# Patient Record
Sex: MALE | Race: WHITE | NOT HISPANIC OR LATINO | Employment: OTHER | URBAN - METROPOLITAN AREA
[De-identification: names, ages, dates, MRNs, and addresses within clinical notes are randomized per-mention and may not be internally consistent; named-entity substitution may affect disease eponyms.]

---

## 2020-12-22 ENCOUNTER — TELEPHONE (OUTPATIENT)
Dept: ORTHOPEDIC SURGERY | Facility: CLINIC | Age: 70
End: 2020-12-22

## 2020-12-22 NOTE — TELEPHONE ENCOUNTER
Pt called saying he has been treated for Olecranon bursitis of left elbow by Francisco Salas MD on 7/6/20 it has been drained. Bursa sack has filled again and wants to know if you would see him.

## 2021-01-06 ENCOUNTER — OFFICE VISIT (OUTPATIENT)
Dept: ORTHOPEDIC SURGERY | Facility: CLINIC | Age: 71
End: 2021-01-06

## 2021-01-06 VITALS — WEIGHT: 235 LBS | BODY MASS INDEX: 31.83 KG/M2 | HEIGHT: 72 IN | TEMPERATURE: 98 F

## 2021-01-06 DIAGNOSIS — M25.522 LEFT ELBOW PAIN: Primary | ICD-10-CM

## 2021-01-06 PROCEDURE — 73070 X-RAY EXAM OF ELBOW: CPT | Performed by: ORTHOPAEDIC SURGERY

## 2021-01-06 PROCEDURE — 99204 OFFICE O/P NEW MOD 45 MIN: CPT | Performed by: ORTHOPAEDIC SURGERY

## 2021-01-06 RX ORDER — OMEPRAZOLE 20 MG/1
20 CAPSULE, DELAYED RELEASE ORAL DAILY
COMMUNITY
Start: 2020-11-16

## 2021-01-06 RX ORDER — CEFAZOLIN SODIUM 2 G/100ML
2 INJECTION, SOLUTION INTRAVENOUS ONCE
Status: CANCELLED | OUTPATIENT
Start: 2021-01-12 | End: 2021-01-06

## 2021-01-06 NOTE — PROGRESS NOTES
Patient: Richy Bright    YOB: 1950    Medical Record Number: [id number]    Chief Complaints:  Left elbow pain    History of Present Illness:     70 y.o. male patient who comes in for his left elbow.  He has previously seen another physician for this issue multiple times.  He reports recurrent pain and swelling over the back of his left elbow.  He has had this drained a couple of times now.  He had a similar problem on the right side that ultimately required surgery.  He tells me that he is frustrated with his persistent symptoms and wants to pursue surgery for the left elbow as well.  Current pain is mild and aching.  It is particularly painful when resting on the point of his elbow.  He reports associated swelling and prominence over the back of the left elbow.    Allergies:   Allergies   Allergen Reactions   • Benzocaine Other (See Comments)     Childhood allergy       Home Medications:    Current Outpatient Medications:   •  econazole nitrate (SPECTAZOLE) 1 % cream, , Disp: , Rfl:   •  lansoprazole (PREVACID) 30 MG capsule, Take 30 mg by mouth Daily., Disp: , Rfl:   •  meloxicam (MOBIC) 15 MG tablet, Take 15 mg by mouth Daily., Disp: , Rfl:   •  Tadalafil (CIALIS PO), Take  by mouth., Disp: , Rfl:   •  traZODone (DESYREL) 150 MG tablet, TAKE ONE-HALF TO ONE TABLET BY MOUTH ONCE NIGHTLY AS NEEDED FOR SLEEP, Disp: , Rfl:   •  amoxicillin-clavulanate (Augmentin) 875-125 MG per tablet, Take 1 tablet by mouth 2 (Two) Times a Day., Disp: 20 tablet, Rfl: 0  •  omeprazole (priLOSEC) 20 MG capsule, , Disp: , Rfl:   Past Medical History:   Diagnosis Date   • Arthritis    • GERD (gastroesophageal reflux disease)    • Hearing loss      Past Surgical History:   Procedure Laterality Date   • EXTERNAL EAR SURGERY     • HERNIA REPAIR       Social History     Occupational History   • Not on file   Tobacco Use   • Smoking status: Never Smoker   • Smokeless tobacco: Never Used   Substance and Sexual Activity   •  "Alcohol use: Not on file   • Drug use: Not on file   • Sexual activity: Not on file      Social History     Social History Narrative   • Not on file     Denies pertinent family history    Review of Systems:      Constitutional: Denies fever, shaking or chills   Eyes: Denies change in visual acuity   HEENT: Denies nasal congestion or sore throat   Respiratory: Denies cough or shortness of breath   Cardiovascular: Denies chest pain or edema  Endocrine: Denies tremors, palpitations, intolerance of heat or cold, polyuria, polydipsia.  GI: Denies abdominal pain, nausea, vomiting, bloody stools or diarrhea  : Denies frequency, urgency, incontinence, retention, or nocturia.  Musculoskeletal: Denies numbness, tingling or loss of motor function except as above  Integument: Denies rash, lesion or ulceration   Neurologic: Denies headache or focal weakness, deficits  Heme: Denies spontaneous or excessive bleeding, epistaxis, hematuria, melena, fatigue, enlarged or tender lymph nodes.      All other pertinent positives and negatives as noted above in HPI.    Physical Exam:   70 y.o. male  Vitals:    01/06/21 1403   Temp: 98 °F (36.7 °C)   TempSrc: Temporal   Weight: 107 kg (235 lb)   Height: 182.9 cm (72\")     General:  Patient is awake and alert.  Appears in no acute distress or discomfort.    Psych:  Affect and demeanor are appropriate.    Eyes:  Conjunctiva and sclera appear grossly normal.  Eyes track well and EOM seem to be intact.    Ears:  No gross abnormalities.  Hearing adequate for the exam.    Cardiovascular:  Regular rate and rhythm.    Lungs:  Good chest expansion.  Breathing unlabored.    Extremities:  Left elbow is examined.  Skin is benign.  No skin breaks, erythema, lesions, or ulcerations.  He has moderate edema and tenderness over posterior aspect of the olecranon with a bursal effusion.  There is a palpable prominence off the tip of the olecranon process.  Full elbow motion.  No instability.  Good strength " with elbow flexion, extension, pronation, supination.  Intact sensation distally.  Palpable radial pulse.  Brisk cap refill.    Radiology:  AP and lateral views of the left elbow are ordered by myself and reviewed to evaluate the patient's complaint.  No comparison films are immediately available.  The x-rays show a prominent enthesophyte at the olecranon process.  There are no obvious acute abnormalities, lesions, masses, significant degenerative changes, or other concerning findings.    Assessment/Plan:  Recalcitrant left olecranon bursitis, promiment enthesophyte    I had a lengthy discussion with the patient regarding the natural history of this condition and treatment options.  He would like to move forward with surgical removal.  We will need to address the enthesophyte.  I explained that this will likely involve taking down a portion of his triceps which will require repair.  This may be a more prolonged rehab for him then what he went through on the other side.  We talked about the risk, benefits and alternatives.  Risks discussed include the risk of infection, wound healing problems, persistent or recurrent swelling, anchor related complications including breakage or failure, repair of any triceps failure and/or rupture of the triceps tendon  and potential need for further or more extensive surgery if that were to occur.  We talked about the risk of resultant pain and/or weakness and/or deformity, iatrogenic injury to nearby nerves and blood vessels particularly the ulnar nerve which could result in permanent weakness and numbness in the hand, major blood vessel injury potentially requiring further surgery, DVT, PE, positioning related neuropraxia especially to the ulnar nerve, and anesthesia related complications resulting in death.  We had a lengthy conversation about the surgery itself and all that that entails as well as the rehabilitation and recovery.  He wants to move forward with the surgery.  He is  hoping to do this as soon as possible as he has an upcoming trip to [place].  We will see if we can accommodate him.      Compa Kimball MD    01/06/2021

## 2021-01-07 ENCOUNTER — PREP FOR SURGERY (OUTPATIENT)
Dept: OTHER | Facility: HOSPITAL | Age: 71
End: 2021-01-07

## 2021-01-07 PROBLEM — M25.522 LEFT ELBOW PAIN: Status: ACTIVE | Noted: 2021-01-07

## 2021-01-08 ENCOUNTER — TRANSCRIBE ORDERS (OUTPATIENT)
Dept: PREADMISSION TESTING | Facility: HOSPITAL | Age: 71
End: 2021-01-08

## 2021-01-08 ENCOUNTER — APPOINTMENT (OUTPATIENT)
Dept: PREADMISSION TESTING | Facility: HOSPITAL | Age: 71
End: 2021-01-08

## 2021-01-08 VITALS
SYSTOLIC BLOOD PRESSURE: 144 MMHG | HEIGHT: 72 IN | WEIGHT: 245 LBS | DIASTOLIC BLOOD PRESSURE: 92 MMHG | BODY MASS INDEX: 33.18 KG/M2 | HEART RATE: 66 BPM | RESPIRATION RATE: 20 BRPM | OXYGEN SATURATION: 95 % | TEMPERATURE: 98.9 F

## 2021-01-08 DIAGNOSIS — Z01.818 OTHER SPECIFIED PRE-OPERATIVE EXAMINATION: Primary | ICD-10-CM

## 2021-01-08 LAB
ANION GAP SERPL CALCULATED.3IONS-SCNC: 8.1 MMOL/L (ref 5–15)
BUN SERPL-MCNC: 17 MG/DL (ref 8–23)
BUN/CREAT SERPL: 21.3 (ref 7–25)
CALCIUM SPEC-SCNC: 8.8 MG/DL (ref 8.6–10.5)
CHLORIDE SERPL-SCNC: 102 MMOL/L (ref 98–107)
CO2 SERPL-SCNC: 23.9 MMOL/L (ref 22–29)
CREAT SERPL-MCNC: 0.8 MG/DL (ref 0.76–1.27)
DEPRECATED RDW RBC AUTO: 43.4 FL (ref 37–54)
ERYTHROCYTE [DISTWIDTH] IN BLOOD BY AUTOMATED COUNT: 12.7 % (ref 12.3–15.4)
GFR SERPL CREATININE-BSD FRML MDRD: 96 ML/MIN/1.73
GLUCOSE SERPL-MCNC: 106 MG/DL (ref 65–99)
HCT VFR BLD AUTO: 44.3 % (ref 37.5–51)
HGB BLD-MCNC: 15.2 G/DL (ref 13–17.7)
MCH RBC QN AUTO: 31.9 PG (ref 26.6–33)
MCHC RBC AUTO-ENTMCNC: 34.3 G/DL (ref 31.5–35.7)
MCV RBC AUTO: 92.9 FL (ref 79–97)
PLATELET # BLD AUTO: 212 10*3/MM3 (ref 140–450)
PMV BLD AUTO: 10.3 FL (ref 6–12)
POTASSIUM SERPL-SCNC: 4.4 MMOL/L (ref 3.5–5.2)
QT INTERVAL: 435 MS
RBC # BLD AUTO: 4.77 10*6/MM3 (ref 4.14–5.8)
SODIUM SERPL-SCNC: 134 MMOL/L (ref 136–145)
WBC # BLD AUTO: 4.89 10*3/MM3 (ref 3.4–10.8)

## 2021-01-08 PROCEDURE — 80048 BASIC METABOLIC PNL TOTAL CA: CPT

## 2021-01-08 PROCEDURE — 93010 ELECTROCARDIOGRAM REPORT: CPT | Performed by: INTERNAL MEDICINE

## 2021-01-08 PROCEDURE — 85027 COMPLETE CBC AUTOMATED: CPT

## 2021-01-08 PROCEDURE — 36415 COLL VENOUS BLD VENIPUNCTURE: CPT

## 2021-01-08 PROCEDURE — 93005 ELECTROCARDIOGRAM TRACING: CPT

## 2021-01-08 NOTE — DISCHARGE INSTRUCTIONS
Take the following medications the morning of surgery:  PREVACID, PRILOSEC    ARRIVE TO OUTPATIENT SURGERY CENTER 1-:  HOSPITAL WILL CALL TO CONFIRM ARRIVAL TIME ([phone])      If you are on prescription narcotic pain medication to control your pain you may also take that medication the morning of surgery.    General Instructions:  • Do not eat solid food after midnight the night before surgery.  • You may drink clear liquids day of surgery but must stop at least one hour before your hospital arrival time.  • It is beneficial for you to have a clear drink that contains carbohydrates the day of surgery.  We suggest a 12 to 20 ounce bottle of Gatorade or Powerade for non-diabetic patients or a 12 to 20 ounce bottle of G2 or Powerade Zero for diabetic patients. (Pediatric patients, are not advised to drink a 12 to 20 ounce carbohydrate drink)    Clear liquids are liquids you can see through.  Nothing red in color.     Plain water                               Sports drinks  Sodas                                   Gelatin (Jell-O)  Fruit juices without pulp such as white grape juice and apple juice  Popsicles that contain no fruit or yogurt  Tea or coffee (no cream or milk added)  Gatorade / Powerade  G2 / Powerade Zero    • Infants may have breast milk up to four hours before surgery.  • Infants drinking formula may drink formula up to six hours before surgery.   • Patients who avoid smoking, chewing tobacco and alcohol for 4 weeks prior to surgery have a reduced risk of post-operative complications.  Quit smoking as many days before surgery as you can.  • Do not smoke, use chewing tobacco or drink alcohol the day of surgery.   • If applicable bring your C-PAP/ BI-PAP machine.  • Bring any papers given to you in the doctor’s office.  • Wear clean comfortable clothes.  • Do not wear contact lenses, false eyelashes or make-up.  Bring a case for your glasses.   • Bring crutches or walker if applicable.  • Remove  all piercings.  Leave jewelry and any other valuables at home.  • Hair extensions with metal clips must be removed prior to surgery.  • The Pre-Admission Testing nurse will instruct you to bring medications if unable to obtain an accurate list in Pre-Admission Testing.          Preventing a Surgical Site Infection:  • For 2 to 3 days before surgery, avoid shaving with a razor because the razor can irritate skin and make it easier to develop an infection.    • Any areas of open skin can increase the risk of a post-operative wound infection by allowing bacteria to enter and travel throughout the body.  Notify your surgeon if you have any skin wounds / rashes even if it is not near the expected surgical site.  The area will need assessed to determine if surgery should be delayed until it is healed.  • The night prior to surgery shower using a fresh bar of anti-bacterial soap (such as Dial) and clean washcloth.  Sleep in a clean bed with clean clothing.  Do not allow pets to sleep with you.  • Shower on the morning of surgery using a fresh bar of anti-bacterial soap (such as Dial) and clean washcloth.  Dry with a clean towel and dress in clean clothing.  • Ask your surgeon if you will be receiving antibiotics prior to surgery.  • Make sure you, your family, and all healthcare providers clean their hands with soap and water or an alcohol based hand  before caring for you or your wound.    Day of surgery:  Your arrival time is approximately two hours before your scheduled surgery time.  Upon arrival, a Pre-op nurse and Anesthesiologist will review your health history, obtain vital signs, and answer questions you may have.  The only belongings needed at this time will be a list of your home medications and if applicable your C-PAP/BI-PAP machine.  A Pre-op nurse will start an IV and you may receive medication in preparation for surgery, including something to help you relax.     Please be aware that surgery does  come with discomfort.  We want to make every effort to control your discomfort so please discuss any uncontrolled symptoms with your nurse.   Your doctor will most likely have prescribed pain medications.      If you are going home after surgery you will receive individualized written care instructions before being discharged.  A responsible adult must drive you to and from the hospital on the day of your surgery and stay with you for 24 hours.  Discharge prescriptions can be filled by the hospital pharmacy during regular pharmacy hours.  If you are having surgery late in the day/evening your prescription may be e-prescribed to your pharmacy.  Please verify your pharmacy hours or chose a 24 hour pharmacy to avoid not having access to your prescription because your pharmacy has closed for the day.    If you are staying overnight following surgery, you will be transported to your hospital room following the recovery period.  [facility] has all private rooms.    If you have any questions please call Pre-Admission Testing at [phone].  Deductibles and co-payments are collected on the day of service. Please be prepared to pay the required co-pay, deductible or deposit on the day of service as defined by your plan.    Patient Education for Self-Quarantine Process    Following your COVID testing, we strongly recommend that you do not leave your home after you have been tested for COVID except to get medical care. This includes not going to work, school or to public areas.  If this is not possible for you to do please limit your activities to only required outings.  Be sure to wear a mask when you are with other people, practice social distancing and wash your hands frequently.      The following items provide additional details to keep you safe.  • Wash your hands with soap and water frequently for at least 20 seconds.   • Avoid touching your eyes, nose and mouth with unwashed hands.  • Do not share  anything - utensils, towels, food from the same bowl.   • Have your own utensils, drinking glass, dishes, towels and bedding.   • Do not have visitors.   • Do use FaceTime to stay in touch with family and friends.  • You should stay in a specific room away from others if possible.   • Stay at least 6 feet away from others in the home if you cannot have a dedicated room to yourself.   • Do not snuggle with your pet. While the CDC says there is no evidence that pets can spread COVID-19 or be infected from humans, it is probably best to avoid “petting, snuggling, being kissed or licked and sharing food (during self-quarantine)”, according to the CDC.   • Sanitize household surfaces daily. Include all high touch areas (door handles, light switches, phones, countertops, etc.)  • Do not share a bathroom with others, if possible.   • Wear a mask around others in your home if you are unable to stay in a separate room or 6 feet apart. If  you are unable to wear a mask, have your family member wear a mask if they must be within 6 feet of you.   Call your surgeon immediately if you experience any of the following symptoms:  • Sore Throat  • Shortness of Breath or difficulty breathing  • Cough  • Chills  • Body soreness or muscle pain  • Headache  • Fever  • New loss of taste or smell  • Do not arrive for your surgery ill.  Your procedure will need to be rescheduled to another time.  You will need to call your physician before the day of surgery to avoid any unnecessary exposure to hospital staff as well as other patients.

## 2021-01-09 ENCOUNTER — LAB (OUTPATIENT)
Dept: LAB | Facility: HOSPITAL | Age: 71
End: 2021-01-09

## 2021-01-09 DIAGNOSIS — Z01.818 OTHER SPECIFIED PRE-OPERATIVE EXAMINATION: ICD-10-CM

## 2021-01-09 PROCEDURE — U0004 COV-19 TEST NON-CDC HGH THRU: HCPCS

## 2021-01-09 PROCEDURE — C9803 HOPD COVID-19 SPEC COLLECT: HCPCS

## 2021-01-11 LAB — SARS-COV-2 RNA RESP QL NAA+PROBE: NOT DETECTED

## 2021-01-12 ENCOUNTER — ANESTHESIA EVENT (OUTPATIENT)
Dept: PERIOP | Facility: HOSPITAL | Age: 71
End: 2021-01-12

## 2021-01-12 ENCOUNTER — HOSPITAL ENCOUNTER (OUTPATIENT)
Facility: HOSPITAL | Age: 71
Setting detail: HOSPITAL OUTPATIENT SURGERY
Discharge: HOME | End: 2021-01-12
Attending: ORTHOPAEDIC SURGERY | Admitting: ORTHOPAEDIC SURGERY

## 2021-01-12 ENCOUNTER — ANESTHESIA (OUTPATIENT)
Dept: PERIOP | Facility: HOSPITAL | Age: 71
End: 2021-01-12

## 2021-01-12 VITALS
OXYGEN SATURATION: 96 % | RESPIRATION RATE: 16 BRPM | TEMPERATURE: 97.1 F | BODY MASS INDEX: 33.19 KG/M2 | HEART RATE: 87 BPM | DIASTOLIC BLOOD PRESSURE: 101 MMHG | WEIGHT: 244.71 LBS | SYSTOLIC BLOOD PRESSURE: 154 MMHG

## 2021-01-12 DIAGNOSIS — M25.522 LEFT ELBOW PAIN: ICD-10-CM

## 2021-01-12 PROCEDURE — 25010000002 MIDAZOLAM PER 1 MG: Performed by: ANESTHESIOLOGY

## 2021-01-12 PROCEDURE — 25010000002 PROPOFOL 10 MG/ML EMULSION: Performed by: NURSE ANESTHETIST, CERTIFIED REGISTERED

## 2021-01-12 PROCEDURE — C1713 ANCHOR/SCREW BN/BN,TIS/BN: HCPCS | Performed by: ORTHOPAEDIC SURGERY

## 2021-01-12 PROCEDURE — 24120 EXC/CRTG B1 CST/B9 TUM RDS: CPT | Performed by: ORTHOPAEDIC SURGERY

## 2021-01-12 PROCEDURE — 25010000002 DEXAMETHASONE PER 1 MG: Performed by: NURSE ANESTHETIST, CERTIFIED REGISTERED

## 2021-01-12 PROCEDURE — 25010000002 HYDROMORPHONE PER 4 MG: Performed by: ANESTHESIOLOGY

## 2021-01-12 PROCEDURE — 25010000002 FENTANYL CITRATE (PF) 100 MCG/2ML SOLUTION: Performed by: ANESTHESIOLOGY

## 2021-01-12 PROCEDURE — 25010000002 KETOROLAC TROMETHAMINE PER 15 MG: Performed by: ANESTHESIOLOGY

## 2021-01-12 PROCEDURE — 25010000002 ROPIVACAINE PER 1 MG: Performed by: ANESTHESIOLOGY

## 2021-01-12 PROCEDURE — 25010000003 CEFAZOLIN IN DEXTROSE 2-4 GM/100ML-% SOLUTION: Performed by: ORTHOPAEDIC SURGERY

## 2021-01-12 PROCEDURE — 24342 REPAIR OF RUPTURED TENDON: CPT | Performed by: ORTHOPAEDIC SURGERY

## 2021-01-12 PROCEDURE — 25010000002 ONDANSETRON PER 1 MG: Performed by: NURSE ANESTHETIST, CERTIFIED REGISTERED

## 2021-01-12 DEVICE — SUT TAPE W/TPR END 1/2 CIR TPR NDL 1.3MM 36IN: Type: IMPLANTABLE DEVICE | Site: ELBOW | Status: FUNCTIONAL

## 2021-01-12 DEVICE — SYS IMP ACL SWIVELOCK 2NDARY/FIX SUTANCH/BIOCOMP 4.75X19.1MM: Type: IMPLANTABLE DEVICE | Site: ELBOW | Status: FUNCTIONAL

## 2021-01-12 RX ORDER — MIDAZOLAM HYDROCHLORIDE 1 MG/ML
2 INJECTION INTRAMUSCULAR; INTRAVENOUS
Status: DISCONTINUED | OUTPATIENT
Start: 2021-01-12 | End: 2021-01-12 | Stop reason: HOSPADM

## 2021-01-12 RX ORDER — SODIUM CHLORIDE, SODIUM LACTATE, POTASSIUM CHLORIDE, CALCIUM CHLORIDE 600; 310; 30; 20 MG/100ML; MG/100ML; MG/100ML; MG/100ML
9 INJECTION, SOLUTION INTRAVENOUS CONTINUOUS
Status: DISCONTINUED | OUTPATIENT
Start: 2021-01-12 | End: 2021-01-12 | Stop reason: HOSPADM

## 2021-01-12 RX ORDER — DEXAMETHASONE SODIUM PHOSPHATE 10 MG/ML
INJECTION INTRAMUSCULAR; INTRAVENOUS AS NEEDED
Status: DISCONTINUED | OUTPATIENT
Start: 2021-01-12 | End: 2021-01-12 | Stop reason: SURG

## 2021-01-12 RX ORDER — EPHEDRINE SULFATE 50 MG/ML
INJECTION, SOLUTION INTRAVENOUS AS NEEDED
Status: DISCONTINUED | OUTPATIENT
Start: 2021-01-12 | End: 2021-01-12 | Stop reason: SURG

## 2021-01-12 RX ORDER — ONDANSETRON 2 MG/ML
4 INJECTION INTRAMUSCULAR; INTRAVENOUS ONCE AS NEEDED
Status: DISCONTINUED | OUTPATIENT
Start: 2021-01-12 | End: 2021-01-12 | Stop reason: HOSPADM

## 2021-01-12 RX ORDER — CEFAZOLIN SODIUM 2 G/100ML
2 INJECTION, SOLUTION INTRAVENOUS ONCE
Status: COMPLETED | OUTPATIENT
Start: 2021-01-12 | End: 2021-01-12

## 2021-01-12 RX ORDER — ACETAMINOPHEN 650 MG
TABLET, EXTENDED RELEASE ORAL AS NEEDED
Status: DISCONTINUED | OUTPATIENT
Start: 2021-01-12 | End: 2021-01-12 | Stop reason: HOSPADM

## 2021-01-12 RX ORDER — MAGNESIUM HYDROXIDE 1200 MG/15ML
LIQUID ORAL AS NEEDED
Status: DISCONTINUED | OUTPATIENT
Start: 2021-01-12 | End: 2021-01-12 | Stop reason: HOSPADM

## 2021-01-12 RX ORDER — DOCUSATE SODIUM 100 MG/1
100 CAPSULE, LIQUID FILLED ORAL 2 TIMES DAILY
Qty: 60 CAPSULE | Refills: 0 | Status: SHIPPED | OUTPATIENT
Start: 2021-01-12 | End: 2021-05-10

## 2021-01-12 RX ORDER — FENTANYL CITRATE 50 UG/ML
50 INJECTION, SOLUTION INTRAMUSCULAR; INTRAVENOUS
Status: DISCONTINUED | OUTPATIENT
Start: 2021-01-12 | End: 2021-01-12 | Stop reason: HOSPADM

## 2021-01-12 RX ORDER — EPHEDRINE SULFATE 50 MG/ML
5 INJECTION, SOLUTION INTRAVENOUS ONCE AS NEEDED
Status: DISCONTINUED | OUTPATIENT
Start: 2021-01-12 | End: 2021-01-12 | Stop reason: HOSPADM

## 2021-01-12 RX ORDER — ONDANSETRON 2 MG/ML
INJECTION INTRAMUSCULAR; INTRAVENOUS AS NEEDED
Status: DISCONTINUED | OUTPATIENT
Start: 2021-01-12 | End: 2021-01-12 | Stop reason: SURG

## 2021-01-12 RX ORDER — LIDOCAINE HYDROCHLORIDE 20 MG/ML
INJECTION, SOLUTION INFILTRATION; PERINEURAL AS NEEDED
Status: DISCONTINUED | OUTPATIENT
Start: 2021-01-12 | End: 2021-01-12 | Stop reason: SURG

## 2021-01-12 RX ORDER — ONDANSETRON 4 MG/1
4 TABLET, FILM COATED ORAL EVERY 8 HOURS PRN
Qty: 30 TABLET | Refills: 0 | Status: SHIPPED | OUTPATIENT
Start: 2021-01-12 | End: 2021-05-10

## 2021-01-12 RX ORDER — HYDROCODONE BITARTRATE AND ACETAMINOPHEN 7.5; 325 MG/1; MG/1
1-2 TABLET ORAL EVERY 4 HOURS PRN
Qty: 42 TABLET | Refills: 0 | Status: SHIPPED | OUTPATIENT
Start: 2021-01-12 | End: 2021-05-10

## 2021-01-12 RX ORDER — ISOPROPYL ALCOHOL 70 ML/100ML
LIQUID TOPICAL AS NEEDED
Status: DISCONTINUED | OUTPATIENT
Start: 2021-01-12 | End: 2021-01-12 | Stop reason: HOSPADM

## 2021-01-12 RX ORDER — KETOROLAC TROMETHAMINE 15 MG/ML
15 INJECTION, SOLUTION INTRAMUSCULAR; INTRAVENOUS ONCE
Status: COMPLETED | OUTPATIENT
Start: 2021-01-12 | End: 2021-01-12

## 2021-01-12 RX ORDER — HYDROCODONE BITARTRATE AND ACETAMINOPHEN 7.5; 325 MG/1; MG/1
1 TABLET ORAL ONCE AS NEEDED
Status: COMPLETED | OUTPATIENT
Start: 2021-01-12 | End: 2021-01-12

## 2021-01-12 RX ORDER — FLUMAZENIL 0.1 MG/ML
0.2 INJECTION INTRAVENOUS AS NEEDED
Status: DISCONTINUED | OUTPATIENT
Start: 2021-01-12 | End: 2021-01-12 | Stop reason: HOSPADM

## 2021-01-12 RX ORDER — SODIUM CHLORIDE 0.9 % (FLUSH) 0.9 %
3-10 SYRINGE (ML) INJECTION AS NEEDED
Status: DISCONTINUED | OUTPATIENT
Start: 2021-01-12 | End: 2021-01-12 | Stop reason: HOSPADM

## 2021-01-12 RX ORDER — PROPOFOL 10 MG/ML
VIAL (ML) INTRAVENOUS AS NEEDED
Status: DISCONTINUED | OUTPATIENT
Start: 2021-01-12 | End: 2021-01-12 | Stop reason: SURG

## 2021-01-12 RX ORDER — FENTANYL CITRATE 50 UG/ML
100 INJECTION, SOLUTION INTRAMUSCULAR; INTRAVENOUS
Status: DISCONTINUED | OUTPATIENT
Start: 2021-01-12 | End: 2021-01-12 | Stop reason: HOSPADM

## 2021-01-12 RX ORDER — OXYCODONE AND ACETAMINOPHEN 7.5; 325 MG/1; MG/1
1 TABLET ORAL ONCE AS NEEDED
Status: DISCONTINUED | OUTPATIENT
Start: 2021-01-12 | End: 2021-01-12 | Stop reason: HOSPADM

## 2021-01-12 RX ORDER — SODIUM CHLORIDE 0.9 % (FLUSH) 0.9 %
3 SYRINGE (ML) INJECTION EVERY 12 HOURS SCHEDULED
Status: DISCONTINUED | OUTPATIENT
Start: 2021-01-12 | End: 2021-01-12 | Stop reason: HOSPADM

## 2021-01-12 RX ORDER — HYDROMORPHONE HYDROCHLORIDE 1 MG/ML
0.5 INJECTION, SOLUTION INTRAMUSCULAR; INTRAVENOUS; SUBCUTANEOUS
Status: DISCONTINUED | OUTPATIENT
Start: 2021-01-12 | End: 2021-01-12 | Stop reason: HOSPADM

## 2021-01-12 RX ORDER — ROPIVACAINE HYDROCHLORIDE 5 MG/ML
INJECTION, SOLUTION EPIDURAL; INFILTRATION; PERINEURAL
Status: COMPLETED | OUTPATIENT
Start: 2021-01-12 | End: 2021-01-12

## 2021-01-12 RX ADMIN — FENTANYL CITRATE 50 MCG: 50 INJECTION, SOLUTION INTRAMUSCULAR; INTRAVENOUS at 13:09

## 2021-01-12 RX ADMIN — LIDOCAINE HYDROCHLORIDE 60 MG: 20 INJECTION, SOLUTION INFILTRATION; PERINEURAL at 11:18

## 2021-01-12 RX ADMIN — EPHEDRINE SULFATE 10 MG: 50 INJECTION INTRAVENOUS at 12:24

## 2021-01-12 RX ADMIN — EPHEDRINE SULFATE 10 MG: 50 INJECTION INTRAVENOUS at 11:36

## 2021-01-12 RX ADMIN — PROPOFOL 180 MG: 10 INJECTION, EMULSION INTRAVENOUS at 11:18

## 2021-01-12 RX ADMIN — HYDROMORPHONE HYDROCHLORIDE 0.5 MG: 1 INJECTION, SOLUTION INTRAMUSCULAR; INTRAVENOUS; SUBCUTANEOUS at 13:00

## 2021-01-12 RX ADMIN — ROPIVACAINE HYDROCHLORIDE 20 ML: 5 INJECTION, SOLUTION EPIDURAL; INFILTRATION; PERINEURAL at 10:26

## 2021-01-12 RX ADMIN — CEFAZOLIN SODIUM 2 G: 2 INJECTION, SOLUTION INTRAVENOUS at 11:24

## 2021-01-12 RX ADMIN — HYDROCODONE BITARTRATE AND ACETAMINOPHEN 1 TABLET: 7.5; 325 TABLET ORAL at 14:00

## 2021-01-12 RX ADMIN — FENTANYL CITRATE 50 MCG: 50 INJECTION, SOLUTION INTRAMUSCULAR; INTRAVENOUS at 14:27

## 2021-01-12 RX ADMIN — SODIUM CHLORIDE, POTASSIUM CHLORIDE, SODIUM LACTATE AND CALCIUM CHLORIDE 9 ML/HR: 600; 310; 30; 20 INJECTION, SOLUTION INTRAVENOUS at 09:38

## 2021-01-12 RX ADMIN — DEXAMETHASONE SODIUM PHOSPHATE 6 MG: 10 INJECTION INTRAMUSCULAR; INTRAVENOUS at 11:25

## 2021-01-12 RX ADMIN — FENTANYL CITRATE 50 MCG: 50 INJECTION, SOLUTION INTRAMUSCULAR; INTRAVENOUS at 10:20

## 2021-01-12 RX ADMIN — KETOROLAC TROMETHAMINE 15 MG: 15 INJECTION, SOLUTION INTRAMUSCULAR; INTRAVENOUS at 13:35

## 2021-01-12 RX ADMIN — MIDAZOLAM 2 MG: 1 INJECTION INTRAMUSCULAR; INTRAVENOUS at 10:20

## 2021-01-12 RX ADMIN — ONDANSETRON HYDROCHLORIDE 4 MG: 2 SOLUTION INTRAMUSCULAR; INTRAVENOUS at 12:00

## 2021-01-12 RX ADMIN — SODIUM CHLORIDE, POTASSIUM CHLORIDE, SODIUM LACTATE AND CALCIUM CHLORIDE 9 ML/HR: 600; 310; 30; 20 INJECTION, SOLUTION INTRAVENOUS at 13:04

## 2021-01-12 NOTE — ANESTHESIA PROCEDURE NOTES
Peripheral Block    Pre-sedation assessment completed: 1/12/2021 10:20 AM    Patient reassessed immediately prior to procedure    Patient location during procedure: pre-op  Start time: 1/12/2021 10:20 AM  Stop time: 1/12/2021 10:24 AM  Reason for block: at surgeon's request and post-op pain management  Performed by  Anesthesiologist: Han Abbott MD  Preanesthetic Checklist  Completed: patient identified, site marked, surgical consent, pre-op evaluation, timeout performed, IV checked, risks and benefits discussed and monitors and equipment checked  Prep:  Sterile barriers:gloves  Prep: ChloraPrep  Patient monitoring: blood pressure monitoring, continuous pulse oximetry and EKG  Procedure  Sedation:yes    Guidance:ultrasound guided  ULTRASOUND INTERPRETATION.  Using ultrasound guidance a 22 G gauge needle was placed in close proximity to the brachial plexus nerve, at which point, under ultrasound guidance anesthetic was injected in the area of the nerve and spread of the anesthesia was seen on ultrasound in close proximity thereto.  There were no abnormalities seen on ultrasound; a digital image was taken; and the patient tolerated the procedure with no complications. Images:still images obtained    Laterality:left  Block Type:interscalene  Injection Technique:single-shot  Needle Type:short-bevel  Needle Gauge:22 G      Medications Used: ropivacaine (NAROPIN) 0.5 % injection, 20 mL      Medications  Comment:.    Post Assessment  Injection Assessment: negative aspiration for heme, no paresthesia on injection and incremental injection  Patient Tolerance:comfortable throughout block  Complications:no

## 2021-01-12 NOTE — OP NOTE
Orthopaedic Operative Note    Facility: [facility]    Patient: Richy Bright    Medical Record Number: [id number]    YOB: 1950    Dictating Surgeon: Compa Kimball M.D.*    Primary Care Physician: Provider, No Known    Date of Operation: 1/12/2021    Pre-Operative Diagnosis:  Left elbow recalcitrant olecranon bursitis with prominent enthesophyte    Post-Operative Diagnosis:  Left elbow recalcitrant olecranon bursitis with prominent enthesophyte    Procedure Performed:      1.  Left triceps tendon repair   2.  Removal of enthesophyte and left elbow olecranon bursectomy    Surgeon: Compa Kimball MD     Assistant: ASHISH Mustafa whose assistance was critical for help with patient positioning, suctioning and irrigation, retraction, manipulation of the extremity for insertion of the implants, wound closure and application of the bandages.  Her assistance was critical to the success of this case.    Anesthesia: Regional followed by general    Complications: None.     Estimated Blood Loss: Less than 50 mL.     Implants: Arthrex 4.75 mm swivel lock with multiple suture tape sutures for triceps tendon repair    Specimens: * No orders in the log *    Brief Operative Indication: Mr. Bright had a history of recalcitrant olecranon bursitis and a prominent enthesophyte.  He had failed conservative treatment and wished to pursue surgical options.  The risks, benefits, and alternatives to an open bursectomy with removal of the enthesophyte and triceps tendon repair were discussed in detail.  We talked about the surgery itself, how it is done, and the rehabilitation and recovery.  Specifically, with regards to the risks, we talked about the risk of infection, wound healing problems, retear of the tendon necessitating revision surgery, persistent pain or recurrent bursitis which could necessitate further intervention down the road.  We talked about injury to nearby neurovascular structures  which could result in permanent weakness, numbness, or dysfunction and potentially necessitate further surgery.  Last, we did also talk about the risk of RSD, PE, DVT, anesthesia related complications and medical complications as result of surgery potentially resulting in death.  He has consented to proceed.     Description of the procedure in detail: The patient and operative site were identified in the preoperative holding area.  The surgical site was marked.  Adequate regional anesthesia of the left upper extremity was then administered by the anesthesiologist.  The patient was taken to the operating room and placed in the supine position.  Adequate general anesthesia was administered.  The patient was repositioned on the operating table.    A timeout was taken and preoperative antibiotics administered.  The left upper extremity was prepped and draped in the standard, sterile fashion.  The left upper extremity was cleaned with alcohol.  A Hibiclens scrub was performed.  The extremity was then prepped with 2 ChloraPreps.  I allowed those to dry for 3 minutes before the draping procedure was carried out.    The arm was exsanguinated with an Esmarch bandage and the tourniquet insufflated to 200 mmHg.  I began the procedure by fashioning an approximately 3 cm incision over the posterior aspect of the elbow.  This was swung laterally directly over the olecranon process to avoid directly incising over this bony prominence.  Full-thickness skin flaps were developed.      The olecranon bursa was carefully dissected out.  This was enlarged and inflamed.  This was very carefully exposed and then removed.  There was a large, palpable enthesophyte off of the tip of the olecranon process.  This was at the center of the triceps insertion.  The triceps was carefully reflected to expose the enthesophyte.  The enthesophyte was then removed using a combination of an osteotome and rongeur.  I would estimate that I had to take down  about a third to half of the triceps insertion.  I determined that a repair was indicated.  The insertion site was debrided back to bleeding, healthy bone to create a healing response.  2 suture tape sutures were passed through the torn tendon in a [place] type fashion.  These were passed through a 4.75 mm swivel lock anchor which was punched and placed in the center of the footprint for the triceps.  The anchor seated well and achieved good fixation in the bone.  This worked very nicely to reapproximate the tendon down to the insertion site.  The excess sutures were cut and removed.  I carried the elbow through range of motion.  The elbow demonstrated full, fluid motion.  There were no mechanical blocks.  The triceps seem to be firmly repaired and stable.  No further pathology was noted.  I directed my attention to closure.    The wound was copiously irrigated out with 500 cc of a Betadine-containing saline solution followed by 500 cc of plain sterile saline.  The tourniquet was deflated and I made sure that we had good hemostasis.  The wound was closed in a layered fashion using 0 Vicryl to repair the fascia and deep tissues, 2-0 Vicryl for the subcutaneous tissues, and nylon suture for the skin.  Sterile dressings were applied to the wound. The arm was placed in a sling.  The patient was awakened and taken to the recovery room in good condition.  There were no complications.    Compa Kimball MD  01/12/21

## 2021-01-12 NOTE — ANESTHESIA POSTPROCEDURE EVALUATION
Patient: Richy Bright    Procedure Summary     Date: 01/12/21 Room / Location:  [facility]    Anesthesia Start: 1110 Anesthesia Stop: 1229    Procedure: Olecranon bursa excision, enthesophyte excision and triceps repair (Left Arm Upper) Diagnosis:       Left elbow pain      (Left elbow pain [M25.522])    Surgeon: Compa Kimball MD Provider: Han Abbott MD    Anesthesia Type: general ASA Status: 2          Anesthesia Type: general    Vitals  Vitals Value Taken Time   /109 01/12/21 1430   Temp 36.2 °C (97.1 °F) 01/12/21 1430   Pulse 86 01/12/21 1443   Resp 18 01/12/21 1430   SpO2 91 % 01/12/21 1443   Vitals shown include unvalidated device data.        Post Anesthesia Care and Evaluation    Patient location during evaluation: PACU (pt seen in pacu previousl)  Patient participation: complete - patient participated  Level of consciousness: awake and alert  Pain management: adequate  Airway patency: patent  Anesthetic complications: No anesthetic complications    Cardiovascular status: acceptable  Respiratory status: acceptable  Hydration status: acceptable    Comments: BP (!) 154/101 (BP Location: Right arm, Patient Position: Lying)   Pulse 87   Temp 36.2 °C (97.1 °F) (Temporal)   Resp 16   Wt 111 kg (244 lb 11.4 oz)   SpO2 96%   BMI 33.19 kg/m²

## 2021-01-12 NOTE — ANESTHESIA PROCEDURE NOTES
Airway  Urgency: elective    Date/Time: 1/12/2021 11:20 AM  Airway not difficult    General Information and Staff    Patient location during procedure: OR  Anesthesiologist: Han Abbott MD  CRNA: Leona Méndez CRNA    Indications and Patient Condition  Indications for airway management: airway protection    Preoxygenated: yes (Pt pre-O2 with 100% O2)  Mask difficulty assessment: 0 - not attempted    Final Airway Details  Final airway type: supraglottic airway      Successful airway: classic  Size 5    Number of attempts at approach: 1  Assessment: lips, teeth, and gum same as pre-op and atraumatic intubation    Additional Comments  ATOLMA x1. No change in dentition. + ETCO2. Airway seal pressure <20cm H2O.

## 2021-01-12 NOTE — ANESTHESIA PREPROCEDURE EVALUATION
Anesthesia Evaluation     Patient summary reviewed and Nursing notes reviewed   NPO Solid Status: > 8 hours             Airway   Mallampati: II  TM distance: >3 FB  Neck ROM: full  no difficulty expected  Dental - normal exam     Pulmonary - negative pulmonary ROS and normal exam   Cardiovascular - negative cardio ROS and normal exam        Neuro/Psych- negative ROS  GI/Hepatic/Renal/Endo - negative ROS     Musculoskeletal (-) negative ROS    Abdominal  - normal exam   Substance History - negative use     OB/GYN negative ob/gyn ROS         Other                        Anesthesia Plan    ASA 2     general   (Isb popc    Not a true benzocaine allergy - he had a sunburn as a child and sprayed benzocaine all over himself and had swelling associated prob with the sunburn )  intravenous induction     Anesthetic plan, all risks, benefits, and alternatives have been provided, discussed and informed consent has been obtained with: patient.    Plan discussed with CRNA.

## 2021-01-12 NOTE — H&P
Patient: Richy Bright    YOB: 1950    Medical Record Number: [id number]    Chief Complaints:  Left elbow pain    History of Present Illness:     70 y.o. male patient who comes in for his left elbow.  He has previously seen another physician for this issue multiple times.  He reports recurrent pain and swelling over the back of his left elbow.  He has had this drained a couple of times now.  He had a similar problem on the right side that ultimately required surgery.  He tells me that he is frustrated with his persistent symptoms and wants to pursue surgery for the left elbow as well.  Current pain is mild and aching.  It is particularly painful when resting on the point of his elbow.  He reports associated swelling and prominence over the back of the left elbow.    Allergies:   Allergies   Allergen Reactions   • Benzocaine Other (See Comments)     Childhood allergy-SWELLING       Home Medications:    Current Facility-Administered Medications:   •  ceFAZolin in dextrose (ANCEF) IVPB solution 2 g, 2 g, Intravenous, Once, Compa Kimball MD  •  fentaNYL citrate (PF) (SUBLIMAZE) injection 100 mcg, 100 mcg, Intravenous, Q15 Min PRN, Víctor Lopez MD  •  lactated ringers infusion, 9 mL/hr, Intravenous, Continuous, Víctor Lopez MD, Last Rate: 9 mL/hr at 01/12/21 0938, 9 mL/hr at 01/12/21 0938  •  midazolam (VERSED) injection 2 mg, 2 mg, Intravenous, Q10 Min PRN, Víctor Lopez MD  •  sodium chloride 0.9 % flush 3 mL, 3 mL, Intravenous, Q12H, Víctor Lopez MD  •  sodium chloride 0.9 % flush 3-10 mL, 3-10 mL, Intravenous, PRN, Víctor Lopez MD  Past Medical History:   Diagnosis Date   • Arthritis    • Bone spur     LEFT ELBOW   • GERD (gastroesophageal reflux disease)    • Hearing loss      Past Surgical History:   Procedure Laterality Date   • ELBOW PROCEDURE Right     HAD A SWOLLEN GLAND IN RIGHT ELBOW REMOVED   • EXTERNAL EAR SURGERY     • HERNIA REPAIR       Social History      Occupational History   • Not on file   Tobacco Use   • Smoking status: Never Smoker   • Smokeless tobacco: Never Used   Substance and Sexual Activity   • Alcohol use: Yes   • Drug use: Never   • Sexual activity: Defer      Social History     Social History Narrative   • Not on file     Denies pertinent family history    Review of Systems:      Constitutional: Denies fever, shaking or chills   Eyes: Denies change in visual acuity   HEENT: Denies nasal congestion or sore throat   Respiratory: Denies cough or shortness of breath   Cardiovascular: Denies chest pain or edema  Endocrine: Denies tremors, palpitations, intolerance of heat or cold, polyuria, polydipsia.  GI: Denies abdominal pain, nausea, vomiting, bloody stools or diarrhea  : Denies frequency, urgency, incontinence, retention, or nocturia.  Musculoskeletal: Denies numbness, tingling or loss of motor function except as above  Integument: Denies rash, lesion or ulceration   Neurologic: Denies headache or focal weakness, deficits  Heme: Denies spontaneous or excessive bleeding, epistaxis, hematuria, melena, fatigue, enlarged or tender lymph nodes.      All other pertinent positives and negatives as noted above in HPI.    Physical Exam:   70 y.o. male  Vitals:    01/12/21 0910   BP: (!) 161/104   Pulse: 63   Resp: 16   Temp: 98 °F (36.7 °C)   TempSrc: Oral   SpO2: 95%   Weight: 111 kg (244 lb 11.4 oz)     General:  Patient is awake and alert.  Appears in no acute distress or discomfort.    Psych:  Affect and demeanor are appropriate.    Eyes:  Conjunctiva and sclera appear grossly normal.  Eyes track well and EOM seem to be intact.    Ears:  No gross abnormalities.  Hearing adequate for the exam.    Cardiovascular:  Regular rate and rhythm.    Lungs:  Good chest expansion.  Breathing unlabored.    Extremities:  Left elbow is examined.  Skin is benign.  No skin breaks, erythema, lesions, or ulcerations.  He has moderate edema and tenderness over posterior  aspect of the olecranon with a bursal effusion.  There is a palpable prominence off the tip of the olecranon process.  Full elbow motion.  No instability.  Good strength with elbow flexion, extension, pronation, supination.  Intact sensation distally.  Palpable radial pulse.  Brisk cap refill.    Radiology:  AP and lateral views of the left elbow are ordered by myself and reviewed to evaluate the patient's complaint.  No comparison films are immediately available.  The x-rays show a prominent enthesophyte at the olecranon process.  There are no obvious acute abnormalities, lesions, masses, significant degenerative changes, or other concerning findings.    Assessment/Plan:  Recalcitrant left olecranon bursitis, promiment enthesophyte    I had a lengthy discussion with the patient regarding the natural history of this condition and treatment options.  He would like to move forward with surgical removal.  We will need to address the enthesophyte.  I explained that this will likely involve taking down a portion of his triceps which will require repair.  This may be a more prolonged rehab for him then what he went through on the other side.  We talked about the risk, benefits and alternatives.  Risks discussed include the risk of infection, wound healing problems, persistent or recurrent swelling, anchor related complications including breakage or failure, repair of any triceps failure and/or rupture of the triceps tendon  and potential need for further or more extensive surgery if that were to occur.  We talked about the risk of resultant pain and/or weakness and/or deformity, iatrogenic injury to nearby nerves and blood vessels particularly the ulnar nerve which could result in permanent weakness and numbness in the hand, major blood vessel injury potentially requiring further surgery, DVT, PE, positioning related neuropraxia especially to the ulnar nerve, and anesthesia related complications resulting in death.  We  had a lengthy conversation about the surgery itself and all that that entails as well as the rehabilitation and recovery.  He wants to move forward with the surgery.  He is hoping to do this as soon as possible as he has an upcoming trip to [place].  We will see if we can accommodate him.      Compa Kimball MD    01/06/2021    History and physical updated today.  No changes.  We will proceed as scheduled.    Compa Kimball MD      01/12/21

## 2021-01-12 NOTE — BRIEF OP NOTE
TRICEP TENDON REPAIR  Progress Note    Richy Bright  1/12/2021    Pre-op Diagnosis:   Left elbow pain [M25.522]       Post-Op Diagnosis Codes:     * Left elbow pain [M25.522]    Procedure/CPT® Codes:        Procedure(s):  Olecranon bursa excision, enthesophyte excision and triceps repair    Surgeon(s):  Compa Kimball MD    Anesthesia: General    Staff:   Circulator: Marisel Ross RN; Darlene Melendez RN  Scrub Person: Autumn Velazquez  Vendor Representative: John Dorsey  Assistant: Farideh Duval APRN  Assistant: Farideh Duval APRN      Estimated Blood Loss: minimal    Urine Voided: * No values recorded between 1/12/2021 11:07 AM and 1/12/2021 12:07 PM *    Specimens:                None          Drains: * No LDAs found *    Findings: see dictation    Complications: none    Assistant: Farideh Duval APRN was responsible for performing the following activities: Retraction, suctioning and irrigation, manipulation of the arm for insertion of the implants and wound closure and their skilled assistance was necessary for the success of this case.    Compa Kimball MD     Date: 1/12/2021  Time: 12:07 EST

## 2021-01-13 ENCOUNTER — TELEPHONE (OUTPATIENT)
Dept: ORTHOPEDIC SURGERY | Facility: CLINIC | Age: 71
End: 2021-01-13

## 2021-01-13 NOTE — TELEPHONE ENCOUNTER
Post-op follow up call.  I spoke to Mr. Bright.  Reports the nerve block wore off this morning.  Pain is well controlled with oral medications.  I answered all questions to his satisfaction.  I encouraged him to call us with any questions or concerns prior to his follow-up appointment next week.  He acknowledged understanding and appreciated the call.

## 2021-01-18 ENCOUNTER — OFFICE VISIT (OUTPATIENT)
Dept: ORTHOPEDIC SURGERY | Facility: CLINIC | Age: 71
End: 2021-01-18

## 2021-01-18 VITALS — TEMPERATURE: 97.9 F | HEIGHT: 72 IN | BODY MASS INDEX: 32.51 KG/M2 | WEIGHT: 240 LBS

## 2021-01-18 DIAGNOSIS — Z09 SURGERY FOLLOW-UP: Primary | ICD-10-CM

## 2021-01-18 PROCEDURE — 99024 POSTOP FOLLOW-UP VISIT: CPT | Performed by: ORTHOPAEDIC SURGERY

## 2021-01-18 RX ORDER — TRAMADOL HYDROCHLORIDE 50 MG/1
50 TABLET ORAL EVERY 4 HOURS PRN
Qty: 60 TABLET | Refills: 0 | Status: SHIPPED | OUTPATIENT
Start: 2021-01-18 | End: 2021-07-12

## 2021-01-18 NOTE — PROGRESS NOTES
Mr. Bright is now a week out from surgery.  He has been taking high-dose Tylenol for the pain.  He estimates he is taking 2000 mg every 4 hours.  The narcotics were a little too strong.  Denies any problems or issues.  Overall, he feels like the elbow is steadily getting better.    His wound is benign appearing.  Sutures remain in place.  Elbow motion is nearly full, lacking just about 10 degrees of flexion.  Triceps is palpably intact.    Assessment: 1 week status post left elbow bursal excision and triceps repair    Plan: I considered too early to take his stitches out.  I recommend having him come back next week for suture removal.  I explained that he is taking far too much Tylenol and he is at risk for toxicity.  I agreed to call in a prescription for tramadol.  Risk of this medicine were discussed.  I will see him back next week for suture removal.  We discussed appropriate activity modifications and restrictions in the interim.

## 2021-01-25 ENCOUNTER — OFFICE VISIT (OUTPATIENT)
Dept: ORTHOPEDIC SURGERY | Facility: CLINIC | Age: 71
End: 2021-01-25

## 2021-01-25 VITALS — HEIGHT: 72 IN | BODY MASS INDEX: 32.51 KG/M2 | TEMPERATURE: 97.2 F | WEIGHT: 240 LBS

## 2021-01-25 DIAGNOSIS — Z09 SURGERY FOLLOW-UP: Primary | ICD-10-CM

## 2021-01-25 PROCEDURE — 99024 POSTOP FOLLOW-UP VISIT: CPT | Performed by: NURSE PRACTITIONER

## 2021-01-25 RX ORDER — LISINOPRIL 10 MG/1
TABLET ORAL
COMMUNITY
Start: 2021-01-22 | End: 2022-06-22

## 2021-01-25 NOTE — PROGRESS NOTES
"Mr. Bright is 2 weeks status post left elbow bursal excision and triceps repair.  He follows up today for suture removal.  He reports he tried the tramadol prescribed by Dr. Kimball, however, this caused significant constipation.  He tells me his pain is well controlled with Tylenol.    Vitals:    01/25/21 0844   Temp: 97.2 °F (36.2 °C)   Weight: 109 kg (240 lb)   Height: 182.9 cm (72\")     Exam: Wound is benign appearing.  Sutures remain in place.  Elbow motion is full.  Triceps is palpably intact.    Assessment: 2 weeks status post left elbow bursal excision and triceps repair    Plan: Sutures removed today and replaced with Steri-Strips.  We discussed appropriate activity modifications and restrictions.  He is leaving for [place] soon.  I instructed him to avoid getting the wound wet in the ocean, hot tub, or pool.  He may shower with Steri-Strips in place.  He will follow-up with Dr. Kimball in 1 month.  We discussed the proper dosage for Tylenol and the fact he should never exceed more than 4000 mg in 24 hours.  Acknowledged understanding of all we discussed.      Farideh Duval, APRN    01/25/2021    "

## 2021-02-01 ENCOUNTER — TELEPHONE (OUTPATIENT)
Dept: ORTHOPEDIC SURGERY | Facility: CLINIC | Age: 71
End: 2021-02-01

## 2021-02-01 NOTE — TELEPHONE ENCOUNTER
Patient called and states he is having a lot of pain in his arm and swelling and burning. He would like to be worked into your schedule on Wednesday. I offered for him to see Farideh but he wants only to see you at this time. He also stated that he tried to do an appointment request on my chart last week and never got a response

## 2021-02-02 NOTE — TELEPHONE ENCOUNTER
I have scheduled pt to see Dr. Kimball on Friday at [facility]. I have call the patient and no answer. Patient is active on my chart so I did send him a [employer] message as well.

## 2021-02-05 ENCOUNTER — OFFICE VISIT (OUTPATIENT)
Dept: ORTHOPEDIC SURGERY | Facility: CLINIC | Age: 71
End: 2021-02-05

## 2021-02-05 VITALS — WEIGHT: 240 LBS | HEIGHT: 72 IN | TEMPERATURE: 96.8 F | BODY MASS INDEX: 32.51 KG/M2

## 2021-02-05 DIAGNOSIS — Z09 SURGERY FOLLOW-UP: ICD-10-CM

## 2021-02-05 DIAGNOSIS — M25.522 LEFT ELBOW PAIN: Primary | ICD-10-CM

## 2021-02-05 PROCEDURE — 99024 POSTOP FOLLOW-UP VISIT: CPT | Performed by: ORTHOPAEDIC SURGERY

## 2021-02-05 PROCEDURE — 73070 X-RAY EXAM OF ELBOW: CPT | Performed by: ORTHOPAEDIC SURGERY

## 2021-02-05 RX ORDER — ACETAMINOPHEN 500 MG
500 TABLET ORAL EVERY 6 HOURS PRN
COMMUNITY

## 2021-02-05 NOTE — PROGRESS NOTES
Mr. Bright follows up for his left elbow.  He has been having some pain and swelling and he was concerned.  He is leaving for [place] tomorrow.  No fevers, chills, sweats or drainage.    His incision is healed.  No drainage or erythema.  He has a trace residual bursal effusion.  On palpation, the triceps is palpably intact.  Mild tenderness over the tip of the olecranon process.  He has full elbow motion.    Assessment: Follow-up now 3 weeks status post left elbow open olecranon bursal excision, enthesophyte excision and triceps repair    Plan: I questioned him about how active he has been.  He admits that he has actually been very active.  He is not doing any heavy lifting but he has not been holding back with his day-to-day activities.  I think he is just overdoing it and it is causing him increased pain and swelling.  I think he needs to take it a little easier.  His repair seems to be intact but he needs to exercise a little more caution.  He acknowledged understanding of my concerns.  I will see him back when he gets back from [place].

## 2021-03-01 ENCOUNTER — OFFICE VISIT (OUTPATIENT)
Dept: ORTHOPEDIC SURGERY | Facility: CLINIC | Age: 71
End: 2021-03-01

## 2021-03-01 VITALS — BODY MASS INDEX: 31.83 KG/M2 | TEMPERATURE: 97.2 F | HEIGHT: 72 IN | WEIGHT: 235 LBS

## 2021-03-01 DIAGNOSIS — Z09 SURGERY FOLLOW-UP: Primary | ICD-10-CM

## 2021-03-01 PROCEDURE — 99024 POSTOP FOLLOW-UP VISIT: CPT | Performed by: ORTHOPAEDIC SURGERY

## 2021-03-01 NOTE — PROGRESS NOTES
"Mr. Bright is now about 6 weeks out from his bursal excision, enthesophyte excision and triceps repair.  He is still having some burning discomfort on the back of his elbow as well as some itching.  He reports that resting on the point of his elbow remains uncomfortable.  It is taking longer to get better than he anticipated.  He tells me he has tried to be careful with the elbow.  He did have one incident where he was pushing himself up out of a Jacuzzi with his left arm.  He had some pain in the elbow at that time.  He has not noticed any functional deficit.  He does feel like his left triceps is \"a little flabby\" compared to the right.    He has 1 Vicryl stitch visible extruding from the wound.  The wound itself looks healed.  There is no erythema, drainage or other evidence for infection.  Mild tenderness along the incision itself.  He has a trace bursal effusion.  His triceps tendon is palpably intact.  He does have some atrophy of his left triceps as compared to the right.  He has full elbow motion.  He can actively extend against resistance with mild discomfort.    Assessment: 6-week status post left triceps repair, enthesophyte excision and olecranon bursal excision    Plan: He seems to be doing okay but he is frustrated that it is taking this long.  He had a much quicker recovery on the right side.  I explained that we had to do a lot more work on the left than what they did on the right.  This is probably going to take another few months.  He has some incisional discomfort and I suggested a prescription from [facility].  I will get that ordered for him.  We discussed appropriate activity modifications and restrictions.  He needs to continue to exercise caution to protect the repair.  I am going to have him follow-up with me in 6 weeks.  "

## 2021-03-02 DIAGNOSIS — Z23 IMMUNIZATION DUE: ICD-10-CM

## 2021-04-12 ENCOUNTER — OFFICE VISIT (OUTPATIENT)
Dept: ORTHOPEDIC SURGERY | Facility: CLINIC | Age: 71
End: 2021-04-12

## 2021-04-12 VITALS — BODY MASS INDEX: 31.83 KG/M2 | HEIGHT: 72 IN | TEMPERATURE: 98 F | WEIGHT: 235 LBS

## 2021-04-12 DIAGNOSIS — Z09 SURGERY FOLLOW-UP: Primary | ICD-10-CM

## 2021-04-12 PROCEDURE — 99024 POSTOP FOLLOW-UP VISIT: CPT | Performed by: ORTHOPAEDIC SURGERY

## 2021-04-12 NOTE — PROGRESS NOTES
Chief complaint: Follow-up status post left elbow bursa and enthesophyte excision with triceps repair    Mr. Bright follows up today for his left elbow.  He says it is much better.  The compound pharmacy formulation did help.  He still has some occasional discomfort, particularly when driving.  Otherwise, things are steadily improving and he is happy with his progress.    His incision is healed.  Triceps is palpably intact.  Mild incisional discomfort on palpation.  Full elbow motion.    Assessment: 3-month status post left elbow bursa and enthesophyte excision with triceps repair    Plan: Progress activities as tolerated.  We will have him follow-up in 3 months for final visit.

## 2021-05-10 ENCOUNTER — OFFICE VISIT (OUTPATIENT)
Dept: ORTHOPEDIC SURGERY | Facility: CLINIC | Age: 71
End: 2021-05-10

## 2021-05-10 ENCOUNTER — TELEPHONE (OUTPATIENT)
Dept: ORTHOPEDIC SURGERY | Facility: CLINIC | Age: 71
End: 2021-05-10

## 2021-05-10 VITALS — BODY MASS INDEX: 32.51 KG/M2 | TEMPERATURE: 97.1 F | HEIGHT: 72 IN | WEIGHT: 240 LBS

## 2021-05-10 DIAGNOSIS — Z09 SURGERY FOLLOW-UP: ICD-10-CM

## 2021-05-10 DIAGNOSIS — M25.522 LEFT ELBOW PAIN: Primary | ICD-10-CM

## 2021-05-10 PROCEDURE — 99213 OFFICE O/P EST LOW 20 MIN: CPT | Performed by: NURSE PRACTITIONER

## 2021-05-10 PROCEDURE — 73070 X-RAY EXAM OF ELBOW: CPT | Performed by: NURSE PRACTITIONER

## 2021-05-10 RX ORDER — METHYLPREDNISOLONE 4 MG/1
TABLET ORAL
Qty: 21 TABLET | Refills: 0 | Status: SHIPPED | OUTPATIENT
Start: 2021-05-10 | End: 2022-06-22

## 2021-05-12 NOTE — PROGRESS NOTES
"Chief Complaint:  Left elbow pain    HPI:  Mr. Bright returns to clinic today for follow-up left elbow pain.  He is 4 months status post left elbow bursa and enthesophyte excision with triceps repair by Dr. Kimball.  Patient reports he was doing very well until recently.  Reports he is experiencing moderate pain which is described as burning.  He reports his pain is waking him at night.  Additionally, he is experiencing redness, warmth, and itching at the site.  He denies injury or precipitating factors.  He says he has been very careful to avoid pressure on the elbow.    Vitals:    05/10/21 1431   Temp: 97.1 °F (36.2 °C)   TempSrc: Temporal   Weight: 109 kg (240 lb)   Height: 182.9 cm (72\")     Exam:  His incision is well healed.  Skin with erythema, posterior elbow about the incision.  No edema or effusion.  Increased warmth compared to the contralateral side.  Mild incisional discomfort with palpation.  Triceps is palpably intact.  Full elbow motion.         Imaging:  Dr. Kimball and I reviewed the x-rays together.  AP and lateral views of the left elbow are ordered by myself and reviewed to evaluate the patient's complaint.  No comparison films are immediately available.  The x-rays show no obvious acute abnormalities, lesions, masses, significant degenerative changes, or other concerning findings.    Assessment:  4 months status post left elbow bursa and enthesophyte excision with triceps repair with pain and erythema, suspect triceps tendon irritation    Plan:  His elbow does not seem to be infected.  His symptoms are likely from irritation of the triceps tendon.  Also, there is a possibility the bursa is trying to reform.  We discussed conservative treatments including ice, rest, activity modification, and oral steroids.  I offered to provide him with an elbow brace, but he declined.  He acknowledged understanding of all we discussed and agrees to gives these a try.  I have given him a prescription for a " Medrol Dosepak.  The risks of this medication were discussed.  Going forward, he will follow up with Dr. Kimball if his symptoms persist.      Farideh Duval, APRN     05/10/2021

## 2021-07-12 ENCOUNTER — OFFICE VISIT (OUTPATIENT)
Dept: ORTHOPEDIC SURGERY | Facility: CLINIC | Age: 71
End: 2021-07-12

## 2021-07-12 VITALS — BODY MASS INDEX: 31.83 KG/M2 | WEIGHT: 235 LBS | TEMPERATURE: 98.1 F | HEIGHT: 72 IN

## 2021-07-12 DIAGNOSIS — Z98.890 HX OF ELBOW SURGERY: ICD-10-CM

## 2021-07-12 DIAGNOSIS — M25.522 LEFT ELBOW PAIN: Primary | ICD-10-CM

## 2021-07-12 PROCEDURE — 73070 X-RAY EXAM OF ELBOW: CPT | Performed by: NURSE PRACTITIONER

## 2021-07-12 PROCEDURE — 99213 OFFICE O/P EST LOW 20 MIN: CPT | Performed by: NURSE PRACTITIONER

## 2021-07-12 RX ORDER — ATORVASTATIN CALCIUM 10 MG/1
10 TABLET, FILM COATED ORAL DAILY
COMMUNITY
Start: 2021-06-30 | End: 2022-06-22

## 2021-07-12 NOTE — PROGRESS NOTES
"Chief Complaint:  Follow up left elbow pain    HPI:  Mr. Bright returns to clinic today for left elbow follow up.  He is 7 months status post left elbow bursa and enthesophyte excision with triceps repair by Dr. Kimball.  Patient reports he is experiencing some persistent, intermittent burning pain which has improve somewhat since his last visit.  He says weakness is his main concern.  Reports doing a breast stroke while swimming is very painful.  Reports he cannot pull himself up the ladder to exit the swimming pool or push up from the floor due to weakness.      Vitals:    07/12/21 0815   Temp: 98.1 °F (36.7 °C)   Weight: 107 kg (235 lb)   Height: 182.9 cm (72\")       Exam: Scar on left elbow consistent with prior surgery.  Skin is benign.  No effusion.  No edema.  No increased warmth or erythema.  Tenderness to deep palpation over the triceps insertion.  Triceps is palpably intact.  Full elbow motion.  Good strength with resisted forward extension of the elbow albeit with discomfort.  Good strength with wrist flexion and extension, , and pinch.  Intact motor and sensory function in lower arm, hand, and fingers.  Palpable radial pulse.  Brisk capillary refill.     Imaging: AP and lateral views of the left elbow are ordered and reviewed to evaluate the patient's complaint.  These are compared to previous films.  The x-rays show no obvious acute abnormalities, lesions, masses, significant degenerative changes, or other concerning findings.    Assessment:  7 months status post left elbow bursa and enthesophyte excision with triceps repair with chronic pain    Plan:   On exam his triceps tendon appears to be intact.  The pain his is experiencing is possible related to ulnar nerve irritation.  I explained that nerves regenerate very slowly.  He strongly feels there is something wrong based on the weakness he is experiencing.  I recommended getting a MRI for further evaluation.  He agreed.  I have entered this " referral for him.  I or Dr. Kimball will call him with the results and come up with a plan at that time.  He verbalized understanding and agreed with this plan.     Farideh Duval, APRN  07/12/2021

## 2021-07-23 ENCOUNTER — HOSPITAL ENCOUNTER (OUTPATIENT)
Dept: MRI IMAGING | Facility: HOSPITAL | Age: 71
Discharge: HOME | End: 2021-07-23
Admitting: NURSE PRACTITIONER

## 2021-07-23 DIAGNOSIS — M25.522 LEFT ELBOW PAIN: ICD-10-CM

## 2021-07-23 DIAGNOSIS — Z98.890 HX OF ELBOW SURGERY: ICD-10-CM

## 2021-07-23 PROCEDURE — 73221 MRI JOINT UPR EXTREM W/O DYE: CPT

## 2021-07-28 ENCOUNTER — TELEPHONE (OUTPATIENT)
Dept: ORTHOPEDIC SURGERY | Facility: CLINIC | Age: 71
End: 2021-07-28

## 2021-07-28 DIAGNOSIS — Z98.890 HX OF ELBOW SURGERY: Primary | ICD-10-CM

## 2021-07-28 NOTE — TELEPHONE ENCOUNTER
I spoke with him.  We discussed his MRI results.  I have referred him to formal physical therapy.  I instructed him to follow-up with me in about 6 weeks if no better.

## 2021-10-06 ENCOUNTER — TELEPHONE (OUTPATIENT)
Dept: ORTHOPEDIC SURGERY | Facility: CLINIC | Age: 71
End: 2021-10-06

## 2021-10-06 NOTE — TELEPHONE ENCOUNTER
I spoke to Mr. Bright.  I apologize for the confusion.  I have no explanation for why he was not contacted regarding physical therapy.  I have printed the physical therapy order and will mail that to him today.  Address verified with patient.  He verbalized understanding and appreciated the assistance.

## 2021-10-06 NOTE — TELEPHONE ENCOUNTER
Caller:  DIANNA HORAN    Relationship to patient: SELF    Best call back number:     Patient is needing: Patient called in to hub, very frustrated & concerned about his referral for PT.  Per patient, he wants the order/REFERRAL for PT to be sent to him, so he can seek PT at another place - other than [facility]. Please reach out to the patient. As the patient wasn't sure of the name of the place he wants to go, but knows it's in [place], [place].  Verified phone# with [employer] to be correct - per patient can call anytime - as that is his cell - no certain time or day.

## 2022-06-22 ENCOUNTER — OFFICE VISIT (OUTPATIENT)
Dept: INTERNAL MEDICINE | Facility: CLINIC | Age: 72
End: 2022-06-22

## 2022-06-22 ENCOUNTER — LAB (OUTPATIENT)
Dept: LAB | Facility: HOSPITAL | Age: 72
End: 2022-06-22

## 2022-06-22 VITALS
DIASTOLIC BLOOD PRESSURE: 84 MMHG | SYSTOLIC BLOOD PRESSURE: 136 MMHG | HEART RATE: 63 BPM | WEIGHT: 244.9 LBS | BODY MASS INDEX: 33.17 KG/M2 | OXYGEN SATURATION: 94 % | HEIGHT: 72 IN

## 2022-06-22 DIAGNOSIS — G89.29 CHRONIC LEFT-SIDED LOW BACK PAIN WITH LEFT-SIDED SCIATICA: ICD-10-CM

## 2022-06-22 DIAGNOSIS — Z12.5 PROSTATE CANCER SCREENING: ICD-10-CM

## 2022-06-22 DIAGNOSIS — N40.0 ENLARGED PROSTATE: ICD-10-CM

## 2022-06-22 DIAGNOSIS — Z00.00 MEDICARE ANNUAL WELLNESS VISIT, INITIAL: ICD-10-CM

## 2022-06-22 DIAGNOSIS — M25.522 LEFT ELBOW PAIN: ICD-10-CM

## 2022-06-22 DIAGNOSIS — E78.2 MIXED HYPERLIPIDEMIA: ICD-10-CM

## 2022-06-22 DIAGNOSIS — I10 PRIMARY HYPERTENSION: Primary | ICD-10-CM

## 2022-06-22 DIAGNOSIS — F51.01 PRIMARY INSOMNIA: ICD-10-CM

## 2022-06-22 DIAGNOSIS — M54.42 CHRONIC LEFT-SIDED LOW BACK PAIN WITH LEFT-SIDED SCIATICA: ICD-10-CM

## 2022-06-22 PROBLEM — R56.9 SEIZURE (HCC): Status: ACTIVE | Noted: 2022-06-22

## 2022-06-22 LAB
ALBUMIN SERPL-MCNC: 4.3 G/DL (ref 3.5–5.2)
ALBUMIN/GLOB SERPL: 1.6 G/DL
ALP SERPL-CCNC: 69 U/L (ref 39–117)
ALT SERPL W P-5'-P-CCNC: 36 U/L (ref 1–41)
ANION GAP SERPL CALCULATED.3IONS-SCNC: 11 MMOL/L (ref 5–15)
AST SERPL-CCNC: 26 U/L (ref 1–40)
BILIRUB SERPL-MCNC: 0.4 MG/DL (ref 0–1.2)
BUN SERPL-MCNC: 18 MG/DL (ref 8–23)
BUN/CREAT SERPL: 17.6 (ref 7–25)
CALCIUM SPEC-SCNC: 9.4 MG/DL (ref 8.6–10.5)
CHLORIDE SERPL-SCNC: 102 MMOL/L (ref 98–107)
CHOLEST SERPL-MCNC: 173 MG/DL (ref 0–200)
CO2 SERPL-SCNC: 24 MMOL/L (ref 22–29)
CREAT SERPL-MCNC: 1.02 MG/DL (ref 0.76–1.27)
EGFRCR SERPLBLD CKD-EPI 2021: 78.6 ML/MIN/1.73
GLOBULIN UR ELPH-MCNC: 2.7 GM/DL
GLUCOSE SERPL-MCNC: 108 MG/DL (ref 65–99)
HDLC SERPL-MCNC: 56 MG/DL (ref 40–60)
LDLC SERPL CALC-MCNC: 101 MG/DL (ref 0–100)
LDLC/HDLC SERPL: 1.78 {RATIO}
POTASSIUM SERPL-SCNC: 5 MMOL/L (ref 3.5–5.2)
PROT SERPL-MCNC: 7 G/DL (ref 6–8.5)
PSA SERPL-MCNC: 0.59 NG/ML (ref 0–4)
SODIUM SERPL-SCNC: 137 MMOL/L (ref 136–145)
TRIGL SERPL-MCNC: 87 MG/DL (ref 0–150)
VLDLC SERPL-MCNC: 16 MG/DL (ref 5–40)

## 2022-06-22 PROCEDURE — 1170F FXNL STATUS ASSESSED: CPT | Performed by: FAMILY MEDICINE

## 2022-06-22 PROCEDURE — 99214 OFFICE O/P EST MOD 30 MIN: CPT | Performed by: FAMILY MEDICINE

## 2022-06-22 PROCEDURE — 1125F AMNT PAIN NOTED PAIN PRSNT: CPT | Performed by: FAMILY MEDICINE

## 2022-06-22 PROCEDURE — 80061 LIPID PANEL: CPT | Performed by: FAMILY MEDICINE

## 2022-06-22 PROCEDURE — 36415 COLL VENOUS BLD VENIPUNCTURE: CPT | Performed by: FAMILY MEDICINE

## 2022-06-22 PROCEDURE — G0103 PSA SCREENING: HCPCS | Performed by: FAMILY MEDICINE

## 2022-06-22 PROCEDURE — 1159F MED LIST DOCD IN RCRD: CPT | Performed by: FAMILY MEDICINE

## 2022-06-22 PROCEDURE — G0439 PPPS, SUBSEQ VISIT: HCPCS | Performed by: FAMILY MEDICINE

## 2022-06-22 PROCEDURE — 80053 COMPREHEN METABOLIC PANEL: CPT | Performed by: FAMILY MEDICINE

## 2022-06-22 RX ORDER — GABAPENTIN 100 MG/1
100 CAPSULE ORAL 3 TIMES DAILY
Qty: 90 CAPSULE | Refills: 0 | Status: SHIPPED | OUTPATIENT
Start: 2022-06-22 | End: 2022-07-22 | Stop reason: SDUPTHER

## 2022-06-22 RX ORDER — TADALAFIL 5 MG/1
TABLET ORAL
COMMUNITY

## 2022-06-22 NOTE — PROGRESS NOTES
The ABCs of the Annual Wellness Visit  Subsequent Medicare Wellness Visit    Chief Complaint   Patient presents with   • new patient visit   • Hypertension   • Hyperlipidemia   • would like to have a PSA   • Medicare Wellness-Initial Visit      Subjective    History of Present Illness:  Richy Bright is a 71 y.o. male who presents for a Subsequent Medicare Wellness Visit.    The following portions of the patient's history were reviewed and   updated as appropriate: allergies, current medications, past family history, past medical history, past social history, past surgical history and problem list.     Compared to one year ago, the patient feels his physical   health is better.    Compared to one year ago, the patient feels his mental   health is better.    Recent Hospitalizations:  He was not admitted to the hospital during the last year.       Current Medical Providers:  Patient Care Team:  George Sherwood MD as PCP - General (Family Medicine)  Chichi Jaimes MD (Family Medicine)    Outpatient Medications Prior to Visit   Medication Sig Dispense Refill   • acetaminophen (TYLENOL) 500 MG tablet Take 500 mg by mouth Every 6 (Six) Hours As Needed for Mild Pain .     • Loratadine (CLARITIN PO) Take 1 tablet by mouth Daily.     • omeprazole (priLOSEC) 20 MG capsule Take 20 mg by mouth Daily.     • tadalafil (CIALIS) 5 MG tablet at bed time.     • traZODone (DESYREL) 150 MG tablet Take 75 mg by mouth Every Night.     • meloxicam (MOBIC) 15 MG tablet Take 15 mg by mouth Daily. HOLD FOR SURGERY     • Tadalafil (CIALIS PO) Take 1 tablet by mouth Daily As Needed. HOLD 48 HOURS PRIOR TO SURGERY     • atorvastatin (LIPITOR) 10 MG tablet Take 10 mg by mouth Daily.     • econazole nitrate (SPECTAZOLE) 1 % cream Apply 1 application topically to the appropriate area as directed Daily. JOCK ITCH     • lansoprazole (PREVACID) 30 MG capsule Take 30 mg by mouth Daily.     • lisinopril (PRINIVIL,ZESTRIL) 10 MG tablet      •  "methylPREDNISolone (MEDROL) 4 MG dose pack Take as directed on package instructions. 21 tablet 0     No facility-administered medications prior to visit.       No opioid medication identified on active medication list. I have reviewed chart for other potential  high risk medication/s and harmful drug interactions in the elderly.          Aspirin is not on active medication list.  Aspirin use is not indicated based on review of current medical condition/s. Risk of harm outweighs potential benefits.  .    Patient Active Problem List   Diagnosis   • Left elbow pain   • Primary hypertension   • Mixed hyperlipidemia   • Enlarged prostate   • Medicare annual wellness visit, initial   • Primary insomnia   • Seizure (HCC)   • Prostate cancer screening   • Chronic left-sided low back pain with left-sided sciatica     Advance Care Planning   Advance Directive is on file.  ACP discussion was held with the patient during this visit. Patient has an advance directive in EMR which is still valid.           Objective       Vitals:    06/22/22 0908   BP: 136/84   BP Location: Right arm   Patient Position: Sitting   Cuff Size: Large Adult   Pulse: 63   SpO2: 94%   Weight: 111 kg (244 lb 14.4 oz)   Height: 182.9 cm (72\")   PainSc:   8     BMI Readings from Last 1 Encounters:   06/22/22 33.21 kg/m²   BMI is above normal parameters. Recommendations include: nutrition counseling    Does the patient have evidence of cognitive impairment? No    Physical Exam            HEALTH RISK ASSESSMENT    Smoking Status:  Social History     Tobacco Use   Smoking Status Never Smoker   Smokeless Tobacco Never Used     Alcohol Consumption:  Social History     Substance and Sexual Activity   Alcohol Use Yes     Fall Risk Screen:    STEADI Fall Risk Assessment was completed, and patient is at LOW risk for falls.Assessment completed on:6/22/2022    Depression Screening:  PHQ-2/PHQ-9 Depression Screening 6/22/2022   Little Interest or Pleasure in Doing " Things 3-->nearly every day   Feeling Down, Depressed or Hopeless 0-->not at all   Trouble Falling or Staying Asleep, or Sleeping Too Much 3-->nearly every day   Feeling Tired or Having Little Energy 0-->not at all   Poor Appetite or Overeating 0-->not at all   Feeling Bad about Yourself - or that You are a Failure or Have Let Yourself or Your Family Down 0-->not at all   Trouble Concentrating on Things, Such as Reading the Newspaper or Watching Television 0-->not at all   Moving or Speaking So Slowly that Other People Could Have Noticed? Or the Opposite - Being So Fidgety 0-->not at all   Thoughts that You Would be Better Off Dead or of Hurting Yourself in Some Way 0-->not at all   PHQ-9: Brief Depression Severity Measure Score 6   If You Checked Off Any Problems, How Difficult Have These Problems Made It For You to Do Your Work, Take Care of Things at Home, or Get Along with Other People? not difficult at all       Health Habits and Functional and Cognitive Screening:  Functional & Cognitive Status 6/22/2022   Do you have difficulty preparing food and eating? No   Do you have difficulty bathing yourself, getting dressed or grooming yourself? No   Do you have difficulty using the toilet? No   Do you have difficulty moving around from place to place? No   Do you have trouble with steps or getting out of a bed or a chair? No   Current Diet Well Balanced Diet   Dental Exam Up to date   Eye Exam Up to date   Exercise (times per week) 7 times per week   Current Exercises Include Walking;Yard Work   Do you need help using the phone?  No   Are you deaf or do you have serious difficulty hearing?  Yes   Do you need help with transportation? No   Do you need help shopping? No   Do you need help preparing meals?  No   Do you need help with housework?  No   Do you need help with laundry? No   Do you need help taking your medications? No   Do you need help managing money? No   Do you ever drive or ride in a car without wearing  a seat belt? No   Have you felt unusual stress, anger or loneliness in the last month? No   Who do you live with? Other   If you need help, do you have trouble finding someone available to you? No   Have you been bothered in the last four weeks by sexual problems? No   Do you have difficulty concentrating, remembering or making decisions? No       Age-appropriate Screening Schedule:  Refer to the list below for future screening recommendations based on patient's age, sex and/or medical conditions. Orders for these recommended tests are listed in the plan section. The patient has been provided with a written plan.    Health Maintenance   Topic Date Due   • TDAP/TD VACCINES (1 - Tdap) Never done   • LIPID PANEL  06/22/2022   • INFLUENZA VACCINE  10/01/2022   • ZOSTER VACCINE  Completed              Assessment & Plan     CMS Preventative Services Quick Reference  Risk Factors Identified During Encounter  Chronic Pain   Obesity/Overweight   The above risks/problems have been discussed with the patient.  Follow up actions/plans if indicated are seen below in the Assessment/Plan Section.  Pertinent information has been shared with the patient in the After Visit Summary.    Diagnoses and all orders for this visit:    1. Primary hypertension (Primary)  -     Comprehensive Metabolic Panel    2. Mixed hyperlipidemia  -     Lipid Panel    3. Enlarged prostate    4. Medicare annual wellness visit, initial    5. Left elbow pain    6. Primary insomnia    7. Prostate cancer screening  -     PSA Screen    8. Chronic left-sided low back pain with left-sided sciatica  -     gabapentin (NEURONTIN) 100 MG capsule; Take 1 capsule by mouth 3 (Three) Times a Day.  Dispense: 90 capsule; Refill: 0        Follow Up:   Return in about 6 months (around 12/22/2022), or if symptoms worsen or fail to improve, for Recheck.     An After Visit Summary and PPPS were given to the patient.  Ongoing  management of chronic medical problems.

## 2022-06-22 NOTE — PROGRESS NOTES
"Chief Complaint  new patient visit, Hypertension, Hyperlipidemia, would like to have a PSA, and Medicare Wellness-Initial Visit    Subjective        Richy Bright presents to [facility]  History of Present Illness  Patient new to the office with chronic problems of hypertension hyperlipidemia chronic low back pain chronic left elbow pain enlarged prostate with family history of prostate cancer would like a PSA checked he does not take any blood pressure medicine he monitors it with goal of less than 140/90 he had been on statin therapy and no longer is taking he has no significant change in his weight he is has had chronic low back pain most of his life been on and off medications he does not take the meloxicam is taking right now on is doing very much as he does take some Tylenol  He has no falling down he sitting comfortably  With his pain is presently at an 8/10 is learning to live with it.   bursa excision Dr. Kimball and January 2021.    Objective   Vital Signs:  /84 (BP Location: Right arm, Patient Position: Sitting, Cuff Size: Large Adult)   Pulse 63   Ht 182.9 cm (72\")   Wt 111 kg (244 lb 14.4 oz)   SpO2 94%   BMI 33.21 kg/m²   Estimated body mass index is 33.21 kg/m² as calculated from the following:    Height as of this encounter: 182.9 cm (72\").    Weight as of this encounter: 111 kg (244 lb 14.4 oz).          Physical Exam  Vitals and nursing note reviewed.   Constitutional:       Appearance: Normal appearance. He is obese.   HENT:      Head: Normocephalic and atraumatic.      Right Ear: Tympanic membrane, ear canal and external ear normal.      Left Ear: Tympanic membrane, ear canal and external ear normal.   Eyes:      General: No scleral icterus.  Cardiovascular:      Rate and Rhythm: Normal rate and regular rhythm.      Pulses: Normal pulses.      Heart sounds: Normal heart sounds.   Pulmonary:      Effort: Pulmonary effort is normal.      Breath sounds: " Normal breath sounds.   Abdominal:      Tenderness: There is no abdominal tenderness. There is no right CVA tenderness or left CVA tenderness.   Musculoskeletal:         General: Normal range of motion.   Skin:     General: Skin is warm and dry.      Findings: No rash.   Neurological:      General: No focal deficit present.      Mental Status: He is alert.   Psychiatric:         Mood and Affect: Mood normal.         Behavior: Behavior normal.         Thought Content: Thought content normal.         Judgment: Judgment normal.        Result Review :        Data reviewed: Radiologic studies July 2021 MRI elbow . Expected postoperative change from olecranon bursa, enthesophyte excision and distal triceps tendon repair mild edema         Assessment and Plan   Diagnoses and all orders for this visit:    1. Primary hypertension (Primary)  -     Comprehensive Metabolic Panel    2. Mixed hyperlipidemia  -     Lipid Panel    3. Enlarged prostate    4. Medicare annual wellness visit, initial    5. Left elbow pain  -     Ambulatory Referral to Orthopedic Surgery    6. Primary insomnia    7. Prostate cancer screening  -     PSA Screen    8. Chronic left-sided low back pain with left-sided sciatica  -     gabapentin (NEURONTIN) 100 MG capsule; Take 1 capsule by mouth 3 (Three) Times a Day.  Dispense: 90 capsule; Refill: 0    No prostate symptoms  Left elbow pain patient request consultation with orthopedist  Initiate gabapentin low back pain with sciatica  Discontinue meloxicam  Follow-up results of blood work otherwise as needed or       Follow Up   Return in about 1 month (around 7/22/2022), or if symptoms worsen or fail to improve, for Recheck.  Patient was given instructions and counseling regarding his condition or for health maintenance advice. Please see specific information pulled into the AVS if appropriate.

## 2022-07-22 ENCOUNTER — OFFICE VISIT (OUTPATIENT)
Dept: INTERNAL MEDICINE | Facility: CLINIC | Age: 72
End: 2022-07-22

## 2022-07-22 ENCOUNTER — LAB (OUTPATIENT)
Dept: LAB | Facility: HOSPITAL | Age: 72
End: 2022-07-22

## 2022-07-22 VITALS
OXYGEN SATURATION: 93 % | SYSTOLIC BLOOD PRESSURE: 150 MMHG | WEIGHT: 245.3 LBS | HEIGHT: 72 IN | DIASTOLIC BLOOD PRESSURE: 96 MMHG | HEART RATE: 87 BPM | BODY MASS INDEX: 33.23 KG/M2

## 2022-07-22 DIAGNOSIS — M25.522 LEFT ELBOW PAIN: ICD-10-CM

## 2022-07-22 DIAGNOSIS — G89.29 CHRONIC LEFT-SIDED LOW BACK PAIN WITH LEFT-SIDED SCIATICA: ICD-10-CM

## 2022-07-22 DIAGNOSIS — R73.9 HYPERGLYCEMIA: ICD-10-CM

## 2022-07-22 DIAGNOSIS — I10 PRIMARY HYPERTENSION: Primary | ICD-10-CM

## 2022-07-22 DIAGNOSIS — M54.42 CHRONIC LEFT-SIDED LOW BACK PAIN WITH LEFT-SIDED SCIATICA: ICD-10-CM

## 2022-07-22 LAB — HBA1C MFR BLD: 5.8 % (ref 4.8–5.6)

## 2022-07-22 PROCEDURE — 83036 HEMOGLOBIN GLYCOSYLATED A1C: CPT | Performed by: FAMILY MEDICINE

## 2022-07-22 PROCEDURE — 36415 COLL VENOUS BLD VENIPUNCTURE: CPT | Performed by: FAMILY MEDICINE

## 2022-07-22 PROCEDURE — 99214 OFFICE O/P EST MOD 30 MIN: CPT | Performed by: FAMILY MEDICINE

## 2022-07-22 RX ORDER — GABAPENTIN 100 MG/1
100 CAPSULE ORAL 2 TIMES DAILY
Qty: 180 CAPSULE | Refills: 1 | Status: SHIPPED | OUTPATIENT
Start: 2022-07-22 | End: 2023-01-26

## 2022-07-22 RX ORDER — OLMESARTAN MEDOXOMIL 20 MG/1
20 TABLET ORAL DAILY
Qty: 30 TABLET | Refills: 6 | Status: SHIPPED | OUTPATIENT
Start: 2022-07-22 | End: 2022-08-24 | Stop reason: ALTCHOICE

## 2022-07-22 NOTE — PROGRESS NOTES
"Chief Complaint  follow up to hypertension    Subjective        Richy Bright presents to [facility]  History of Present Illness  Uncontrolled chronic problem hypertension  New problem hyperglycemia  Controlled chronic problem left elbow pain  Controlled chronic problem low back pain  Objective   Vital Signs:  /96 (BP Location: Right arm, Patient Position: Sitting, Cuff Size: Large Adult)   Pulse 87   Ht 182.9 cm (72\")   Wt 111 kg (245 lb 4.8 oz)   SpO2 93%   BMI 33.27 kg/m²   Estimated body mass index is 33.27 kg/m² as calculated from the following:    Height as of this encounter: 182.9 cm (72\").    Weight as of this encounter: 111 kg (245 lb 4.8 oz).          Physical Exam  Vitals and nursing note reviewed.   Constitutional:       Appearance: Normal appearance.   Pulmonary:      Effort: Pulmonary effort is normal.   Musculoskeletal:      Right lower leg: No edema.      Left lower leg: No edema.   Neurological:      Mental Status: He is alert.   Psychiatric:         Mood and Affect: Mood normal.         Behavior: Behavior normal.         Thought Content: Thought content normal.         Judgment: Judgment normal.        Result Review :    Common labs    Common Labsle 6/22/22 6/22/22 6/22/22    1030 1030 1030   Glucose   108 (A)   BUN   18   Creatinine   1.02   Sodium   137   Potassium   5.0   Chloride   102   Calcium   9.4   Albumin   4.30   Total Bilirubin   0.4   Alkaline Phosphatase   69   AST (SGOT)   26   ALT (SGPT)   36   Total Cholesterol 173     Triglycerides 87     HDL Cholesterol 56     LDL Cholesterol  101 (A)     PSA  0.587    (A) Abnormal value                      Assessment and Plan   Diagnoses and all orders for this visit:    1. Primary hypertension (Primary)  -     olmesartan (Benicar) 20 MG tablet; Take 1 tablet by mouth Daily.  Dispense: 30 tablet; Refill: 6    2. Chronic left-sided low back pain with left-sided sciatica  -     gabapentin (NEURONTIN) 100 " MG capsule; Take 1 capsule by mouth 2 (Two) Times a Day.  Dispense: 180 capsule; Refill: 1    3. Hyperglycemia  -     Hemoglobin A1c    4. Left elbow pain    New problem hypertension new medication  Patient was given Dr. Reagan's office number to call for consultation since he has not heard from their office yet regarding chronic elbow pain  Monitor blood pressure goals less than 140/90 patient will call in 1 month update readings  Follow-up results of laboratory work for further evaluation of hyperglycemia  Continue Neurontin twice a day stable dose for back pain       Follow Up   Return in about 6 months (around 1/22/2023), or if symptoms worsen or fail to improve, for Recheck.  Patient was given instructions and counseling regarding his condition or for health maintenance advice. Please see specific information pulled into the AVS if appropriate.

## 2022-08-12 ENCOUNTER — NURSE TRIAGE (OUTPATIENT)
Dept: CALL CENTER | Facility: HOSPITAL | Age: 72
End: 2022-08-12

## 2022-08-12 NOTE — TELEPHONE ENCOUNTER
" HUB call, then sent to [place], at the office soft transfer, told pt. To be seen in ER today and wants office Visit Tuesday, he is in [place] till Sunday. Triage done told needs to be seen in ER, he says is better today, no chest pain, but it did last all night, till 3AM with left arm involvement, but none today, ate lots of greasy food last night, and was belching a lot last night, had some nausea last night and  had BM today feels better, he states, told  him needs to be checked today. He verbalized understanding. Last night chest pain left side rated 7-8, left arm hurt all night also, left arm is sore today he states    Reason for Disposition  • Patient sounds very sick or weak to the triager    Additional Information  • Negative: SEVERE difficulty breathing (e.g., struggling for each breath, speaks in single words)  • Negative: Difficult to awaken or acting confused (e.g., disoriented, slurred speech)  • Negative: Shock suspected (e.g., cold/pale/clammy skin, too weak to stand, low BP, rapid pulse)  • Negative: Passed out (i.e., fainted, collapsed and was not responding)  • Negative: [1] Chest pain lasts > 5 minutes AND [2] age > 44  • Negative: [1] Chest pain lasts > 5 minutes AND [2] age > 30 AND [3] one or more cardiac risk factors (e.g., diabetes, high blood pressure, high cholesterol, smoker, or strong family history of heart disease)  • Negative: [1] Chest pain lasts > 5 minutes AND [2] history of heart disease (i.e., angina, heart attack, heart failure, bypass surgery, takes nitroglycerin)  • Negative: [1] Chest pain lasts > 5 minutes AND [2] described as crushing, pressure-like, or heavy  • Negative: Heart beating < 50 beats per minute OR > 140 beats per minute  • Negative: Visible sweat on face or sweat dripping down face  • Negative: Sounds like a life-threatening emergency to the triager  • Negative: Followed a chest injury  • Negative: SEVERE chest pain  • Negative: [1] Chest pain (or \"angina\") comes " "and goes AND [2] is happening more often (increasing in frequency) or getting worse (increasing in severity) (Exception: chest pains that last only a few seconds)  • Negative: Pain also in shoulder(s) or arm(s) or jaw (Exception: pain is clearly made worse by movement)  • Negative: Difficulty breathing  • Negative: Dizziness or lightheadedness  • Negative: Coughing up blood  • Negative: Cocaine use within last 3 days  • Negative: Major surgery in past month  • Negative: Hip or leg fracture (broken bone) in past month (or had cast on leg or ankle in past month)  • Negative: Illness requiring prolonged bedrest in past month (e.g., immobilization, long hospital stay)  • Negative: Long-distance travel in past month (e.g., car, bus, train, plane; with trip lasting 6 or more hours)  • Negative: History of prior \"blood clot\" in leg or lungs (i.e., deep vein thrombosis, pulmonary embolism)  • Negative: History of inherited increased risk of blood clots (e.g., Factor 5 Leiden, Anti-thrombin 3, Protein C or Protein S deficiency, Prothrombin mutation)  • Negative: Cancer treatment in past six months (or has cancer now)  • Negative: [1] Chest pain lasts > 5 minutes AND [2] occurred in past 3 days (72 hours) (Exception: feels exactly the same as previously diagnosed heartburn and has accompanying sour taste in mouth)  • Negative: Taking a deep breath makes pain worse  • Negative: [1] Chest pain lasts > 5 minutes AND [2] occurred > 3 days ago (72 hours) AND [3] NO chest pain or cardiac symptoms now  • Negative: [1] Chest pain lasting < 5 minutes AND [2] NO chest pain or cardiac symptoms (e.g., breathing difficulty, sweating) now (Exception: chest pains that last only a few seconds)  • Negative: Fever > 100.4 F (38.0 C)  • Negative: Rash in same area as pain (may be described as \"small blisters\")  • Negative: [1] Patient says chest pain feels exactly the same as previously diagnosed \"heartburn\" AND [2] describes burning in chest " "AND [3] accompanying sour taste in mouth  • Negative: [1] Chest pain lasting < 5 minutes AND [2] has not taken prescribed nitroglycerin  • Negative: [1] Chest pain lasting < 5 minutes AND [2] completely gone after taking nitroglycerin  • Negative: [1] Chest pain from known angina comes and goes AND [2] is NOT happening more often (increasing in frequency) or getting worse (increasing in severity)  • Negative: [1] Chest pain(s) lasting a few seconds from coughing AND [2] persists > 3 days  • Negative: [1] Chest pain(s) lasting a few seconds AND [2] persists > 3 days  • Negative: Chest pain(s) lasting a few seconds from coughing    Answer Assessment - Initial Assessment Questions  1. LOCATION: \"Where does it hurt?\"        Left side chest  2. RADIATION: \"Does the pain go anywhere else?\" (e.g., into neck, jaw, arms, back)      Arm radiation   3. ONSET: \"When did the chest pain begin?\" (Minutes, hours or days)       Last night now gone  4. PATTERN \"Does the pain come and go, or has it been constant since it started?\"  \"Does it get worse with exertion?\"       Gone today no chest pain  5. DURATION: \"How long does it last\" (e.g., seconds, minutes, hours)      Lasted all night till 3:30 am  6. SEVERITY: \"How bad is the pain?\"  (e.g., Scale 1-10; mild, moderate, or severe)     - MILD (1-3): doesn't interfere with normal activities      - MODERATE (4-7): interferes with normal activities or awakens from sleep     - SEVERE (8-10): excruciating pain, unable to do any normal activities        Pain rated 7-8  7. CARDIAC RISK FACTORS: \"Do you have any history of heart problems or risk factors for heart disease?\" (e.g., angina, prior heart attack; diabetes, high blood pressure, high cholesterol, smoker, or strong family history of heart disease)      HTN  8. PULMONARY RISK FACTORS: \"Do you have any history of lung disease?\"  (e.g., blood clots in lung, asthma, emphysema, birth control pills)      no  9. CAUSE: \"What do you think is " "causing the chest pain?\"      Maybe chest heart pain he says maybe indigestion  10. OTHER SYMPTOMS: \"Do you have any other symptoms?\" (e.g., dizziness, nausea, vomiting, sweating, fever, difficulty breathing, cough)        Had nausea   11. PREGNANCY: \"Is there any chance you are pregnant?\" \"When was your last menstrual period?\"        no    Protocols used: CHEST PAIN-ADULT-AH      "

## 2022-08-15 ENCOUNTER — TELEPHONE (OUTPATIENT)
Dept: INTERNAL MEDICINE | Facility: CLINIC | Age: 72
End: 2022-08-15

## 2022-08-15 NOTE — TELEPHONE ENCOUNTER
Caller: Richy Bright    Relationship: Self    Best call back number: [phone]     What is the best time to reach you: ANY    Who are you requesting to speak with (clinical staff, provider,  specific staff member): CLINICAL, DR. JOE    Do you know the name of the person who called: NONE    What was the call regarding: PATIENT WENT TO [facility]  [address] [place] [address] [address], [place], [place] [id number] [phone].  PATIENT STATED THAT THE ER DIAGNOSED PATIENT WITH A MASSIVE HEART ATTACK.  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  HE HAS ANOTHER SURGERY TODAY.  HE WILL RETURN IN A WEEK TO HIS HOME.  HE IS REQUESTING A REFERRAL TO A CARDIOLOGIST.    THE DOCTORS THAT SAW HIM ARE:  DR LONDONO (CARDIOLOGIST) AND SURGEON DR. ORDONEZ.  HE WANTS TO THANK BOTH GEORGE AND LAURO IN TRIAGE FOR THEIR ASSISTANCE.      Do you require a callback: YES

## 2022-08-24 ENCOUNTER — OFFICE VISIT (OUTPATIENT)
Dept: INTERNAL MEDICINE | Facility: CLINIC | Age: 72
End: 2022-08-24

## 2022-08-24 VITALS
SYSTOLIC BLOOD PRESSURE: 128 MMHG | OXYGEN SATURATION: 96 % | BODY MASS INDEX: 32.47 KG/M2 | DIASTOLIC BLOOD PRESSURE: 84 MMHG | HEIGHT: 72 IN | WEIGHT: 239.7 LBS | HEART RATE: 65 BPM

## 2022-08-24 DIAGNOSIS — I21.01 ST ELEVATION MYOCARDIAL INFARCTION INVOLVING LEFT MAIN CORONARY ARTERY: Primary | ICD-10-CM

## 2022-08-24 DIAGNOSIS — I10 PRIMARY HYPERTENSION: ICD-10-CM

## 2022-08-24 DIAGNOSIS — E78.2 MIXED HYPERLIPIDEMIA: ICD-10-CM

## 2022-08-24 PROBLEM — I25.10 CORONARY ARTERY DISEASE INVOLVING NATIVE CORONARY ARTERY OF NATIVE HEART WITHOUT ANGINA PECTORIS: Status: ACTIVE | Noted: 2022-08-24

## 2022-08-24 PROCEDURE — 99214 OFFICE O/P EST MOD 30 MIN: CPT | Performed by: FAMILY MEDICINE

## 2022-08-24 RX ORDER — LOSARTAN POTASSIUM 50 MG/1
50 TABLET ORAL DAILY
COMMUNITY
Start: 2022-08-16

## 2022-08-24 RX ORDER — METOPROLOL TARTRATE 50 MG/1
50 TABLET, FILM COATED ORAL 2 TIMES DAILY
COMMUNITY
Start: 2022-08-16

## 2022-08-24 RX ORDER — TICAGRELOR 90 MG/1
90 TABLET ORAL 2 TIMES DAILY
COMMUNITY
Start: 2022-08-22 | End: 2022-11-03 | Stop reason: SDUPTHER

## 2022-08-24 RX ORDER — ASPIRIN 81 MG/1
81 TABLET ORAL DAILY
COMMUNITY

## 2022-08-24 RX ORDER — ROSUVASTATIN CALCIUM 20 MG/1
20 TABLET, COATED ORAL
COMMUNITY
Start: 2022-08-16

## 2022-08-24 NOTE — PROGRESS NOTES
"Chief Complaint  [facility] [place] [facility] follow up to heart attack    Subjective        Richy Bright presents to [facility] PRIMARY CARE  History of Present Illness  Patient follows up after MI in [place] stent x3 normal ejection fraction no myocardial damage per patient do not have records available  Discontinued atorvastatin started rosuvastatin  Discontinued olmesartan started losartan  Started Brilinta and metoprolol  Patient feels well no residual complaints some weakness but no shortness of breath no more chest pain  Blood pressure well controlled  Objective   Vital Signs:  /84 (BP Location: Right arm, Patient Position: Sitting, Cuff Size: Large Adult)   Pulse 65   Ht 182.9 cm (72\")   Wt 109 kg (239 lb 11.2 oz)   SpO2 96%   BMI 32.51 kg/m²   Estimated body mass index is 32.51 kg/m² as calculated from the following:    Height as of this encounter: 182.9 cm (72\").    Weight as of this encounter: 109 kg (239 lb 11.2 oz).          Physical Exam  Vitals and nursing note reviewed.   Constitutional:       Appearance: Normal appearance. He is obese.   Cardiovascular:      Rate and Rhythm: Normal rate and regular rhythm.      Pulses: Normal pulses.      Heart sounds: Normal heart sounds.   Pulmonary:      Effort: Pulmonary effort is normal.      Breath sounds: Normal breath sounds.   Musculoskeletal:      Right lower leg: No edema.      Left lower leg: No edema.   Neurological:      Mental Status: He is alert.   Psychiatric:         Mood and Affect: Mood normal.         Behavior: Behavior normal.         Thought Content: Thought content normal.         Judgment: Judgment normal.        Result Review :    Common labs    Common Labsle 6/22/22 6/22/22 6/22/22 7/22/22    1030 1030 1030    Glucose   108 (A)    BUN   18    Creatinine   1.02    Sodium   137    Potassium   5.0    Chloride   102    Calcium   9.4    Albumin   4.30    Total Bilirubin   0.4    Alkaline Phosphatase   69    AST (SGOT)  "  26    ALT (SGPT)   36    Total Cholesterol 173      Triglycerides 87      HDL Cholesterol 56      LDL Cholesterol  101 (A)      Hemoglobin A1C    5.80 (A)   PSA  0.587     (A) Abnormal value                 Requested labs from [place] and cardiology consultation     Assessment and Plan   Diagnoses and all orders for this visit:    1. ST elevation myocardial infarction involving left main coronary artery (HCC) (Primary)    2. Primary hypertension    3. Mixed hyperlipidemia    Monitor blood pressure goals less than 135/85  Hypertension continue losartan  Coronary artery disease continue Brilinta metoprolol aspirin  Hyperlipidemia continue Crestor  Follow-up otherwise as needed or         Follow Up   Return in about 6 months (around 2/24/2023), or if symptoms worsen or fail to improve, for Recheck.  Patient was given instructions and counseling regarding his condition or for health maintenance advice. Please see specific information pulled into the AVS if appropriate.

## 2022-08-26 ENCOUNTER — OFFICE VISIT (OUTPATIENT)
Dept: CARDIOLOGY | Facility: CLINIC | Age: 72
End: 2022-08-26

## 2022-08-26 VITALS
BODY MASS INDEX: 33.02 KG/M2 | DIASTOLIC BLOOD PRESSURE: 60 MMHG | HEART RATE: 48 BPM | HEIGHT: 72 IN | SYSTOLIC BLOOD PRESSURE: 98 MMHG | WEIGHT: 243.8 LBS

## 2022-08-26 DIAGNOSIS — I25.10 CORONARY ARTERY DISEASE INVOLVING NATIVE CORONARY ARTERY OF NATIVE HEART WITHOUT ANGINA PECTORIS: Primary | ICD-10-CM

## 2022-08-26 DIAGNOSIS — E78.2 MIXED HYPERLIPIDEMIA: ICD-10-CM

## 2022-08-26 DIAGNOSIS — T14.8XXA BRUISING: ICD-10-CM

## 2022-08-26 DIAGNOSIS — I51.89 MILD LEFT VENTRICULAR SYSTOLIC DYSFUNCTION: ICD-10-CM

## 2022-08-26 PROBLEM — I10 PRIMARY HYPERTENSION: Status: RESOLVED | Noted: 2022-06-22 | Resolved: 2022-08-26

## 2022-08-26 PROCEDURE — 99204 OFFICE O/P NEW MOD 45 MIN: CPT | Performed by: INTERNAL MEDICINE

## 2022-08-26 PROCEDURE — 93000 ELECTROCARDIOGRAM COMPLETE: CPT | Performed by: INTERNAL MEDICINE

## 2022-08-26 NOTE — PROGRESS NOTES
Date of Office Visit: 22  Encounter Provider: Duke Pelayo MD  Place of Service: [facility] CARDIOLOGY  Patient Name: Richy Bright  :1950    Chief Complaint   Patient presents with   • Coronary Artery Disease   :   HPI:     Mr. Bright is 71 y.o. and presents today to [facility] care.    He lives in [place] for part of the year, and in [place], [place], for part of the year. On 2022, while in [place], he developed severe rest angina. After several hours, he presented to [facility]. He was diagnosed with a non-STEMI and underwent coronary angiography. He was diagnosed with multivessel coronary disease. He underwent JULI x 2 to the RCA and JULI x 1 to the LAD; he does have some mild distal LAD disease as well as moderate-severe branch vessel disease, which was treated medically. He had mildly reduced LV systolic dysfunction, EF 45-50%.    For the most part, he is well. He has been feeling great until today. He's a bit lightheaded today, but this has not been the case. He is very mildly short of breath but it's not bad. He denies leg swelling, chest discomfort, orthopnea, PND, palpitations, or syncope. His procedures were performed in the bilateral groins, and he has a lot of bruising.    Past Medical History:   Diagnosis Date   • Bone spur     LEFT ELBOW   • CAD (coronary artery disease)     NSTEMI 2022: 100% RCA s/p 4x38 and 4x18mm JULI, 85% mid LAD s/p 3x18mm JULI. There was a 95% OM branch lesion and a 70% diag lesion (med mgmt).   • GERD (gastroesophageal reflux disease)    • Hearing loss    • Mixed hyperlipidemia 2022   • Osteoarthritis        Past Surgical History:   Procedure Laterality Date   • CARDIAC CATHETERIZATION     • COLONOSCOPY      about 10 years ago   • CORONARY STENT PLACEMENT     • ELBOW PROCEDURE Right     HAD A SWOLLEN GLAND IN RIGHT ELBOW REMOVED   • EXTERNAL EAR SURGERY     • HERNIA REPAIR     • TRICEP  TENDON REPAIR Left 01/12/2021    Procedure: Olecranon bursa excision, enthesophyte excision and triceps repair;  Surgeon: Compa Kimball MD;  [facility] [place] [facility]  Service: Orthopedics;  Laterality: Left;       Social History     Socioeconomic History   • Marital status:    • Number of children: 3   Tobacco Use   • Smoking status: Never Smoker   • Smokeless tobacco: Never Used   Vaping Use   • Vaping Use: Never used   Substance and Sexual Activity   • Alcohol use: Yes     Alcohol/week: 8.0 standard drinks     Types: 8 Glasses of wine per week   • Drug use: Never   • Sexual activity: Yes     Partners: Male       Family History   Problem Relation Age of Onset   • Other Mother    • Suicidality Father        Review of Systems   Constitutional: Positive for malaise/fatigue.   Cardiovascular: Positive for dyspnea on exertion.   Hematologic/Lymphatic: Bruises/bleeds easily.   Neurological: Positive for excessive daytime sleepiness and light-headedness.   Psychiatric/Behavioral: The patient is nervous/anxious.    All other systems reviewed and are negative.      Allergies   Allergen Reactions   • Benzocaine Other (See Comments)     Childhood allergy-SWELLING         Current Outpatient Medications:   •  acetaminophen (TYLENOL) 500 MG tablet, Take 500 mg by mouth Every 6 (Six) Hours As Needed for Mild Pain ., Disp: , Rfl:   •  aspirin 81 MG EC tablet, Take 81 mg by mouth Daily., Disp: , Rfl:   •  Brilinta 90 MG tablet tablet, Take 90 mg by mouth 2 (Two) Times a Day., Disp: , Rfl:   •  gabapentin (NEURONTIN) 100 MG capsule, Take 1 capsule by mouth 2 (Two) Times a Day., Disp: 180 capsule, Rfl: 1  •  Loratadine (CLARITIN PO), Take 1 tablet by mouth Daily., Disp: , Rfl:   •  losartan (COZAAR) 50 MG tablet, Take 50 mg by mouth Daily., Disp: , Rfl:   •  metoprolol tartrate (LOPRESSOR) 50 MG tablet, Take 50 mg by mouth 2 (Two) Times a Day., Disp: , Rfl:   •  omeprazole (priLOSEC) 20 MG capsule, Take 20 mg by mouth  "Daily., Disp: , Rfl:   •  rosuvastatin (CRESTOR) 20 MG tablet, Take 20 mg by mouth every night at bedtime., Disp: , Rfl:   •  tadalafil (CIALIS) 5 MG tablet, at bed time., Disp: , Rfl:   •  traZODone (DESYREL) 150 MG tablet, Take 75 mg by mouth Every Night., Disp: , Rfl:       Objective:     Vitals:    08/26/22 1028   BP: 98/60   BP Location: Left arm   Pulse: (!) 48   Weight: 111 kg (243 lb 12.8 oz)   Height: 182.9 cm (72\")     Body mass index is 33.07 kg/m².    Vitals reviewed.   Constitutional:       Appearance: Healthy appearance. Well-developed and not in distress.   Eyes:      Conjunctiva/sclera: Conjunctivae normal.   HENT:      Head: Normocephalic.      Nose: Nose normal.         Comments: Masked  Neck:      Vascular: No JVD. JVD normal.      Lymphadenopathy: No cervical adenopathy.   Pulmonary:      Effort: Pulmonary effort is normal.      Breath sounds: Normal breath sounds.   Cardiovascular:      Normal rate. Regular rhythm.      Murmurs: There is no murmur.      Comments: Bilateral groin bruising, but no hematoma or swelling, no skin lesions  Pulses:     Intact distal pulses.   Edema:     Peripheral edema absent.   Abdominal:      Palpations: Abdomen is soft.      Tenderness: There is no abdominal tenderness.   Musculoskeletal: Normal range of motion.      Cervical back: Normal range of motion. Skin:     General: Skin is warm and dry.      Findings: No rash.   Neurological:      General: No focal deficit present.      Mental Status: Alert, oriented to person, place, and time and oriented to person, place and time.      Cranial Nerves: No cranial nerve deficit.   Psychiatric:         Behavior: Behavior normal.         Thought Content: Thought content normal.         Judgment: Judgment normal.           ECG 12 Lead    Date/Time: 8/26/2022 10:46 AM  Performed by: Duke Pelayo MD  Authorized by: Duke Pelayo MD   Comparison: compared with previous ECG   Comparison to previous ECG: Rate is loewr, PVCs " have resolved  Rhythm: sinus bradycardia  Conduction: 1st degree AV block  Q waves: II, III and aVF    T inversion: V6, V5, V4, III and aVF  Other: no other findings    Clinical impression: abnormal EKG            Assessment:       Diagnosis Plan   1. Coronary artery disease involving native coronary artery of native heart without angina pectoris  ECG 12 Lead    Ambulatory Referral to Cardiac Rehab    Adult Transthoracic Echo Complete W/ Cont if Necessary Per Protocol   2. Mixed hyperlipidemia     3. Mild left ventricular systolic dysfunction  Adult Transthoracic Echo Complete W/ Cont if Necessary Per Protocol   4. Bruising        Plan:       1/2. Coronary Artery Disease  Assessment  • The patient has no angina    Subjective - Objective  • There is a history of past MI 8/2022  • There has been a previous stent procedure using JULI 8/2022 x 3    • Current antiplatelet therapy includes aspirin 81 mg and ticagrelor 90 mg  • The patient qualifies for cardiac rehabilitation, and has been referred to cardiac rehab      I am referring him to cardiac rehab. He is on ticagrelor and is a bit dyspneic, but it's not severe. He does not have prescription drug insurance. I would like to continue it for six months, and if cost is an issue, I would be fine with changing him to clopidogrel at that time (in addition to aspirin). He's on rosuvastatin.    3. He needs an echo in mid November (90 days after his infarct). We can do that here or in [place], depending on his travels. His BP is a bit low today, but this has not been an issue until this morning. I would like to just observe things for now; if he remains lightheaded, we may have to decrease his metoprolol dose.    4. He has residual bruising in his groins but it's resolving. He has no hematomas or rash or swelling.     Sincerely,       Duke Pelayo MD

## 2022-08-26 NOTE — PROGRESS NOTES
RM:________    Referral Provider: George Sherwood MD Lang, Vincent J, MD    NEW PATIENT/ CONSULT  PREVIOUS CARDIOLOGIST:   CARDIAC TESTING:     : 1950   AGE: 71 y.o.    2022  REASON FOR VISIT/  CC:      WT: ____________ BP: __________L __________R/ HR_______________    CHEST PAIN: _____________    SOA: ____________PALPS: __________      LIGHTHEADED: ___________ FATIGUE: _______________ EDEMA______________  ALLERGIES:  Benzocaine  SMOKING HISTORY  Social History     Tobacco Use   • Smoking status: Never Smoker   • Smokeless tobacco: Never Used   Vaping Use   • Vaping Use: Never used   Substance Use Topics   • Alcohol use: Yes   • Drug use: Never          CHILDREN: _______________________       CAFFEINE USE________  ALCOHOL _____________  OCCUPATION_____________  Past Surgical History:   Procedure Laterality Date   • COLONOSCOPY      about 10 years ago   • ELBOW PROCEDURE Right     HAD A SWOLLEN GLAND IN RIGHT ELBOW REMOVED   • EXTERNAL EAR SURGERY     • HERNIA REPAIR     • TRICEP TENDON REPAIR Left 2021    Procedure: Olecranon bursa excision, enthesophyte excision and triceps repair;  Surgeon: Compa Kimball MD;  Location: [facility] OR [facility];  Service: Orthopedics;  Laterality: Left;                FAMILY HISTORY  HEART DISEASE  CHF  DIABETES  CARDIAC ARREST  STROKE  CANCER  ANEURYSM                                                             H/O: MI_____   STROKE________   GOUT_____   ANEMIA______     CAROTID________ HIV____ CAD_______ HYPERCHOL _____    H/O: CHF _____   RF____ DM___ HTN_______PVD____THYROID DISEASE_______    PMH: GI ____   HEPATITIS ___ KIDNEY DISEASE ___ LUNG DISEASE _______     SLEEP APNEA ____ BLOOD CLOTS ____ DVT ____ VEIN STRIPPING ___________     CANCER _________________________________ CHEMO/ RADIATION__________

## 2022-09-09 ENCOUNTER — TELEPHONE (OUTPATIENT)
Dept: CARDIAC REHAB | Facility: HOSPITAL | Age: 72
End: 2022-09-09

## 2022-09-09 NOTE — TELEPHONE ENCOUNTER
Have attempted to reach out to patient to schedule cardiac rehab without success, messages left, contact number provided. Will send letter to patient's home regarding program.

## 2022-10-17 ENCOUNTER — TELEPHONE (OUTPATIENT)
Dept: INTERNAL MEDICINE | Facility: CLINIC | Age: 72
End: 2022-10-17

## 2022-10-17 NOTE — TELEPHONE ENCOUNTER
Called Pt. To see if this message ever got taken care of and he said it haven't and was very upset about it and already took the Z-pack that he received in [place]. I went ahead and asked Dr. RODGERS if this was ok with all his other medications and he told me he's never heard of any conflicts between steroid packs and heart medications so I then called the Pt. Back again to let him know it was okay to do that and that I would relay this conversation to his PCP per the Pt.  10/17/22 RCC

## 2022-11-03 RX ORDER — TICAGRELOR 90 MG/1
90 TABLET ORAL 2 TIMES DAILY
Qty: 18 TABLET | Refills: 0 | COMMUNITY
Start: 2022-11-03

## 2022-11-14 ENCOUNTER — HOSPITAL ENCOUNTER (OUTPATIENT)
Dept: CARDIOLOGY | Facility: HOSPITAL | Age: 72
Discharge: HOME | End: 2022-11-14
Admitting: INTERNAL MEDICINE

## 2022-11-14 VITALS
BODY MASS INDEX: 34.02 KG/M2 | SYSTOLIC BLOOD PRESSURE: 134 MMHG | HEART RATE: 42 BPM | DIASTOLIC BLOOD PRESSURE: 86 MMHG | HEIGHT: 71 IN | WEIGHT: 243 LBS

## 2022-11-14 DIAGNOSIS — I51.89 MILD LEFT VENTRICULAR SYSTOLIC DYSFUNCTION: ICD-10-CM

## 2022-11-14 DIAGNOSIS — I25.10 CORONARY ARTERY DISEASE INVOLVING NATIVE CORONARY ARTERY OF NATIVE HEART WITHOUT ANGINA PECTORIS: ICD-10-CM

## 2022-11-14 LAB
AORTIC ARCH: 3.3 CM
ASCENDING AORTA: 4.3 CM
BH CV ECHO MEAS - ACS: 2.1 CM
BH CV ECHO MEAS - AO MAX PG: 6.1 MMHG
BH CV ECHO MEAS - AO MEAN PG: 2.7 MMHG
BH CV ECHO MEAS - AO ROOT DIAM: 4.6 CM
BH CV ECHO MEAS - AO V2 MAX: 123.5 CM/SEC
BH CV ECHO MEAS - AO V2 VTI: 28.4 CM
BH CV ECHO MEAS - AVA(I,D): 2.03 CM2
BH CV ECHO MEAS - EDV(CUBED): 154.1 ML
BH CV ECHO MEAS - EDV(MOD-SP2): 101 ML
BH CV ECHO MEAS - EDV(MOD-SP4): 142 ML
BH CV ECHO MEAS - EF(MOD-BP): 65.2 %
BH CV ECHO MEAS - EF(MOD-SP2): 60.4 %
BH CV ECHO MEAS - EF(MOD-SP4): 66.9 %
BH CV ECHO MEAS - ESV(CUBED): 45.6 ML
BH CV ECHO MEAS - ESV(MOD-SP2): 40 ML
BH CV ECHO MEAS - ESV(MOD-SP4): 47 ML
BH CV ECHO MEAS - FS: 33.4 %
BH CV ECHO MEAS - IVS/LVPW: 0.99 CM
BH CV ECHO MEAS - IVSD: 1.19 CM
BH CV ECHO MEAS - LAT PEAK E' VEL: 9.4 CM/SEC
BH CV ECHO MEAS - LV DIASTOLIC VOL/BSA (35-75): 61.4 CM2
BH CV ECHO MEAS - LV MASS(C)D: 258.9 GRAMS
BH CV ECHO MEAS - LV MAX PG: 2.5 MMHG
BH CV ECHO MEAS - LV MEAN PG: 1.41 MMHG
BH CV ECHO MEAS - LV SYSTOLIC VOL/BSA (12-30): 20.3 CM2
BH CV ECHO MEAS - LV V1 MAX: 79.7 CM/SEC
BH CV ECHO MEAS - LV V1 VTI: 18.2 CM
BH CV ECHO MEAS - LVIDD: 5.4 CM
BH CV ECHO MEAS - LVIDS: 3.6 CM
BH CV ECHO MEAS - LVOT AREA: 3.2 CM2
BH CV ECHO MEAS - LVOT DIAM: 2.01 CM
BH CV ECHO MEAS - LVPWD: 1.2 CM
BH CV ECHO MEAS - MED PEAK E' VEL: 7 CM/SEC
BH CV ECHO MEAS - MR MAX PG: 32.2 MMHG
BH CV ECHO MEAS - MR MAX VEL: 283.7 CM/SEC
BH CV ECHO MEAS - MV A DUR: 0.15 SEC
BH CV ECHO MEAS - MV A MAX VEL: 75.4 CM/SEC
BH CV ECHO MEAS - MV DEC SLOPE: 108.2 CM/SEC2
BH CV ECHO MEAS - MV DEC TIME: 0.3 MSEC
BH CV ECHO MEAS - MV E MAX VEL: 64.7 CM/SEC
BH CV ECHO MEAS - MV E/A: 0.86
BH CV ECHO MEAS - MV MAX PG: 2.31 MMHG
BH CV ECHO MEAS - MV MEAN PG: 0.74 MMHG
BH CV ECHO MEAS - MV P1/2T: 191 MSEC
BH CV ECHO MEAS - MV V2 VTI: 34.8 CM
BH CV ECHO MEAS - MVA(P1/2T): 1.15 CM2
BH CV ECHO MEAS - MVA(VTI): 1.65 CM2
BH CV ECHO MEAS - PA ACC TIME: 0.16 SEC
BH CV ECHO MEAS - PA PR(ACCEL): 6.5 MMHG
BH CV ECHO MEAS - PA V2 MAX: 88.2 CM/SEC
BH CV ECHO MEAS - PI END-D VEL: 101.8 CM/SEC
BH CV ECHO MEAS - PULM A REVS DUR: 0.12 SEC
BH CV ECHO MEAS - PULM A REVS VEL: 19.7 CM/SEC
BH CV ECHO MEAS - PULM DIAS VEL: 30 CM/SEC
BH CV ECHO MEAS - PULM S/D: 1.13
BH CV ECHO MEAS - PULM SYS VEL: 33.8 CM/SEC
BH CV ECHO MEAS - QP/QS: 1.13
BH CV ECHO MEAS - RAP SYSTOLE: 3 MMHG
BH CV ECHO MEAS - RV MAX PG: 1.09 MMHG
BH CV ECHO MEAS - RV V1 MAX: 52.3 CM/SEC
BH CV ECHO MEAS - RV V1 VTI: 16.9 CM
BH CV ECHO MEAS - RVOT DIAM: 2.21 CM
BH CV ECHO MEAS - RVSP: 32.5 MMHG
BH CV ECHO MEAS - SI(MOD-SP2): 26.4 ML/M2
BH CV ECHO MEAS - SI(MOD-SP4): 41.1 ML/M2
BH CV ECHO MEAS - SUP REN AO DIAM: 2.5 CM
BH CV ECHO MEAS - SV(LVOT): 57.6 ML
BH CV ECHO MEAS - SV(MOD-SP2): 61 ML
BH CV ECHO MEAS - SV(MOD-SP4): 95 ML
BH CV ECHO MEAS - SV(RVOT): 64.9 ML
BH CV ECHO MEAS - TAPSE (>1.6): 2.33 CM
BH CV ECHO MEAS - TR MAX PG: 29.5 MMHG
BH CV ECHO MEAS - TR MAX VEL: 271.5 CM/SEC
BH CV ECHO MEASUREMENTS AVERAGE E/E' RATIO: 7.89
BH CV XLRA - RV BASE: 2.9 CM
BH CV XLRA - RV LENGTH: 8.5 CM
BH CV XLRA - RV MID: 3.5 CM
BH CV XLRA - TDI S': 13.6 CM/SEC
LEFT ATRIUM VOLUME INDEX: 36.3 ML/M2
MAXIMAL PREDICTED HEART RATE: 149 BPM
SINUS: 4.1 CM
STJ: 4 CM
STRESS TARGET HR: 127 BPM

## 2022-11-14 PROCEDURE — 93306 TTE W/DOPPLER COMPLETE: CPT

## 2022-11-14 PROCEDURE — 93306 TTE W/DOPPLER COMPLETE: CPT | Performed by: INTERNAL MEDICINE

## 2022-11-15 NOTE — PROGRESS NOTES
MELVINA -- Can you review this study? I see an aortic root of 4.06cm, not 4.6cm.     (Note to myself, I called patient, I don't see anything concerning, will follow up next year)  Alice mitchell

## 2022-11-16 RX ORDER — TRAZODONE HYDROCHLORIDE 150 MG/1
TABLET ORAL
Qty: 60 TABLET | Refills: 2 | Status: SHIPPED | OUTPATIENT
Start: 2022-11-16 | End: 2023-02-06

## 2023-01-26 DIAGNOSIS — M54.42 CHRONIC LEFT-SIDED LOW BACK PAIN WITH LEFT-SIDED SCIATICA: ICD-10-CM

## 2023-01-26 DIAGNOSIS — G89.29 CHRONIC LEFT-SIDED LOW BACK PAIN WITH LEFT-SIDED SCIATICA: ICD-10-CM

## 2023-01-26 RX ORDER — GABAPENTIN 100 MG/1
CAPSULE ORAL
Qty: 180 CAPSULE | Refills: 0 | Status: SHIPPED | OUTPATIENT
Start: 2023-01-26

## 2023-02-06 RX ORDER — TRAZODONE HYDROCHLORIDE 150 MG/1
TABLET ORAL
Qty: 60 TABLET | Refills: 0 | Status: SHIPPED | OUTPATIENT
Start: 2023-02-06

## 2023-05-05 NOTE — PROGRESS NOTES
RM:________     PCP: System, Provider Not In    : 1950  AGE: 72 y.o.  EST PATIENT   REASON FOR VISIT/  CC:    BP Readings from Last 3 Encounters:   22 134/86   22 98/60   22 128/84        WT: ____________ BP: __________L __________R HR______    CHEST PAIN: _____________    SOA: _____________PALPS: _______________     LIGHTHEADED: ___________FATIGUE: ________________ EDEMA __________    ALLERGIES:Benzocaine SMOKING HISTORY:  Social History     Tobacco Use   • Smoking status: Never   • Smokeless tobacco: Never   Vaping Use   • Vaping Use: Never used   Substance Use Topics   • Alcohol use: Yes     Alcohol/week: 8.0 standard drinks     Types: 8 Glasses of wine per week   • Drug use: Never     CAFFEINE USE_________________  ALCOHOL ______________________    Below is the patient's most recent value for Albumin, ALT, AST, BUN, Calcium, Chloride, Cholesterol, CO2, Creatinine, GFR, Glucose, HDL, Hematocrit, Hemoglobin, Hemoglobin A1C, LDL, Magnesium, Phosphorus, Platelets, Potassium, PSA, Sodium, Triglycerides, TSH and WBC.   Lab Results   Component Value Date    ALBUMIN 4.30 2022    ALT 36 2022    AST 26 2022    BUN 18 2022    CALCIUM 9.4 2022     2022    CHOL 173 2022    CO2 24.0 2022    CREATININE 1.02 2022     (H) 2020    HDL 56 2022    HCT 44.3 2021    HGB 15.2 2021    HGBA1C 5.80 (H) 2022     (H) 2022     2021    K 5.0 2022    PSA 0.587 2022     2022    TRIG 87 2022    WBC 4.89 2021          NEW DIAGNOSIS/ SURGERY/ HOSP OR ED VISITS: ______________________    __________________________________________________________________      RECENT LABS OR DIAGNOSTIC TESTING:  _____________________________    __________________________________________________________________      ASSESSMENT/ PLAN:  _______________________________________________    __________________________________________________________________

## 2023-05-17 ENCOUNTER — OFFICE VISIT (OUTPATIENT)
Dept: CARDIOLOGY | Facility: CLINIC | Age: 73
End: 2023-05-17
Payer: MEDICARE

## 2023-05-17 VITALS
BODY MASS INDEX: 34.3 KG/M2 | SYSTOLIC BLOOD PRESSURE: 110 MMHG | WEIGHT: 245 LBS | DIASTOLIC BLOOD PRESSURE: 70 MMHG | HEIGHT: 71 IN | HEART RATE: 65 BPM

## 2023-05-17 DIAGNOSIS — I25.10 CORONARY ARTERY DISEASE INVOLVING NATIVE CORONARY ARTERY OF NATIVE HEART WITHOUT ANGINA PECTORIS: Primary | ICD-10-CM

## 2023-05-17 DIAGNOSIS — E78.2 MIXED HYPERLIPIDEMIA: ICD-10-CM

## 2023-05-17 DIAGNOSIS — I77.810 DILATED AORTIC ROOT: ICD-10-CM

## 2023-05-17 DIAGNOSIS — I10 PRIMARY HYPERTENSION: ICD-10-CM

## 2023-05-17 RX ORDER — CLOPIDOGREL BISULFATE 75 MG/1
75 TABLET ORAL DAILY
Qty: 90 TABLET | Refills: 3 | Status: SHIPPED | OUTPATIENT
Start: 2023-05-17

## 2023-05-17 RX ORDER — SEMAGLUTIDE 0.68 MG/ML
INJECTION, SOLUTION SUBCUTANEOUS WEEKLY
COMMUNITY
Start: 2023-04-17

## 2023-05-17 NOTE — PROGRESS NOTES
Date of Office Visit: 23  Encounter Provider: Duke Pelayo MD  Place of Service: [facility] CARDIOLOGY  Patient Name: Richy Bright  :1950    Chief Complaint   Patient presents with   • Coronary Artery Disease   :   HPI:     Mr. Bright is 72 y.o. and presents today to follow up. I have reviewed prior notes and there are no changes except for any new updates described below. I have also reviewed any information entered into the medical record by the patient or by ancillary staff.     He lives locally for part of the year, but spends a lot of his time in [place], [place]. On 2022, while in [place], he developed severe rest angina. After several hours, he presented to [facility]. He was diagnosed with a non-STEMI and underwent coronary angiography. He was diagnosed with multivessel coronary disease. He underwent JULI x 2 to the RCA and JULI x 1 to the LAD; he had some mild distal LAD disease as well as moderate-severe branch vessel disease, which was treated medically. He had mildly reduced LV systolic dysfunction, EF 45-50%.    He establish care with me after this.  An echocardiogram in 2022 revealed normal left ventricular systolic function and wall motion.  His aortic root was dilated at 4.6 cm.    He has seen his cardiologist in [place] since I saw him last.  He decreased his metoprolol dose due to fatigue.  Mr. Bright is concerned about bruising and bleeding and the cost of ticagrelor and would like to discuss switching it as well.  He is not having any anginal chest pain, palpitations, syncope, or significant shortness of breath.  He denies leg swelling.    Past Medical History:   Diagnosis Date   • Bone spur     LEFT ELBOW   • CAD (coronary artery disease)     NSTEMI 2022: 100% RCA s/p 4x38 and 4x18mm JULI, 85% mid LAD s/p 3x18mm JULI. There was a 95% OM branch lesion and a 70% diag lesion (med mgmt).   • Clotting disorder     • GERD (gastroesophageal reflux disease)    • Hearing loss    • Mixed hyperlipidemia 06/22/2022   • Myocardial infarction    • Osteoarthritis        Past Surgical History:   Procedure Laterality Date   • CARDIAC CATHETERIZATION     • COLONOSCOPY      about 10 years ago   • CORONARY STENT PLACEMENT     • ELBOW PROCEDURE Right     HAD A SWOLLEN GLAND IN RIGHT ELBOW REMOVED   • EXTERNAL EAR SURGERY     • HERNIA REPAIR     • TRICEP TENDON REPAIR Left 01/12/2021    Procedure: Olecranon bursa excision, enthesophyte excision and triceps repair;  Surgeon: Compa Kimball MD;  [facility];  Service: Orthopedics;  Laterality: Left;       Social History     Socioeconomic History   • Marital status:    • Number of children: 3   Tobacco Use   • Smoking status: Never   • Smokeless tobacco: Never   Vaping Use   • Vaping Use: Never used   Substance and Sexual Activity   • Alcohol use: Yes     Alcohol/week: 8.0 standard drinks     Types: 8 Glasses of wine per week   • Drug use: Never   • Sexual activity: Yes     Partners: Male     Birth control/protection: Same-sex partner       Family History   Problem Relation Age of Onset   • Other Mother    • Suicidality Father        Review of Systems   Constitutional: Positive for malaise/fatigue.   Hematologic/Lymphatic: Bruises/bleeds easily.   Neurological: Positive for excessive daytime sleepiness and light-headedness.   Psychiatric/Behavioral: The patient is nervous/anxious.    All other systems reviewed and are negative.      Allergies   Allergen Reactions   • Benzocaine Other (See Comments)     Childhood allergy-SWELLING         Current Outpatient Medications:   •  acetaminophen (TYLENOL) 500 MG tablet, Take 1 tablet by mouth Every 6 (Six) Hours As Needed for Mild Pain., Disp: , Rfl:   •  aspirin 81 MG EC tablet, Take 1 tablet by mouth Daily., Disp: , Rfl:   •  Loratadine (CLARITIN PO), Take 1 tablet by mouth Daily As Needed., Disp: , Rfl:   •  losartan (COZAAR)  "50 MG tablet, Take 1 tablet by mouth Daily., Disp: , Rfl:   •  metoprolol tartrate (LOPRESSOR) 50 MG tablet, Take 25 mg by mouth 2 (Two) Times a Day., Disp: , Rfl:   •  Ozempic, 0.25 or 0.5 MG/DOSE, 2 MG/3ML solution pen-injector, 1 (One) Time Per Week., Disp: , Rfl:   •  rosuvastatin (CRESTOR) 20 MG tablet, Take 1 tablet by mouth every night at bedtime., Disp: , Rfl:   •  tadalafil (CIALIS) 5 MG tablet, at bed time., Disp: , Rfl:   •  traZODone (DESYREL) 150 MG tablet, TAKE TWO TABLETS BY MOUTH EVERY NIGHT AT BEDTIME, Disp: 60 tablet, Rfl: 0  •  clopidogrel (PLAVIX) 75 MG tablet, Take 1 tablet by mouth Daily., Disp: 90 tablet, Rfl: 3  •  gabapentin (NEURONTIN) 100 MG capsule, TAKE 1 CAPSULE BY MOUTH TWO TIMES A DAY (Patient not taking: Reported on 5/17/2023), Disp: 180 capsule, Rfl: 0      Objective:     Vitals:    05/17/23 0911   BP: 110/70   BP Location: Left arm   Pulse: 65   Weight: 111 kg (245 lb)   Height: 180.3 cm (71\")     Body mass index is 34.17 kg/m².    Vitals reviewed.   Constitutional:       Appearance: Healthy appearance. Well-developed and not in distress.   Eyes:      Conjunctiva/sclera: Conjunctivae normal.   HENT:      Head: Normocephalic.      Nose: Nose normal.   Neck:      Vascular: No JVD. JVD normal.      Lymphadenopathy: No cervical adenopathy.   Pulmonary:      Effort: Pulmonary effort is normal.      Breath sounds: Normal breath sounds.   Cardiovascular:      Normal rate. Regular rhythm.      Murmurs: There is no murmur.   Pulses:     Intact distal pulses.   Edema:     Peripheral edema absent.   Abdominal:      Palpations: Abdomen is soft.      Tenderness: There is no abdominal tenderness.   Musculoskeletal: Normal range of motion.      Cervical back: Normal range of motion. Skin:     General: Skin is warm and dry.      Findings: No rash.   Neurological:      General: No focal deficit present.      Mental Status: Alert, oriented to person, place, and time and oriented to person, place and " time.      Cranial Nerves: No cranial nerve deficit.   Psychiatric:         Behavior: Behavior normal.         Thought Content: Thought content normal.         Judgment: Judgment normal.           ECG 12 Lead    Date/Time: 5/17/2023 1:05 PM  Performed by: Duke Pelayo MD  Authorized by: Duke Pelayo MD   Comparison: compared with previous ECG   Similar to previous ECG  Rhythm: sinus rhythm  Ectopy: unifocal PVCs  Conduction: left anterior fascicular block and 1st degree AV block  ST Segments: ST segments normal  T Waves: T waves normal  QRS axis: left  Other: no other findings    Clinical impression: abnormal EKG            Assessment:       Diagnosis Plan   1. Coronary artery disease involving native coronary artery of native heart without angina pectoris        2. Mixed hyperlipidemia        3. Primary hypertension        4. Dilated aortic root           Plan:       1/2. Coronary Artery Disease  Assessment  • The patient has no angina    Subjective - Objective  • There is a history of past MI 8/2022  • There has been a previous stent procedure using JULI 8/2022 x 3    • Current antiplatelet therapy includes aspirin 81 mg and ticagrelor 90 mg  • The patient qualifies for cardiac rehabilitation, and has been referred to cardiac rehab        He continues to have shortness of breath which we have suspected is due to ticagrelor.  He continues to have significant bruising.  As we are more than 6 months out from drug-eluting stent placement, I do feel that we can switch his DAPT over to aspirin/clopidogrel.    He had mildly reduced left ventricular systolic function after his MI, but a follow-up echocardiogram revealed normal EF/wall motion.    3/4.  His blood pressure is well controlled on losartan and metoprolol.  His aortic root measured 4.4 cm by echo in November 2022 and will need to be evaluated after our next visit.    Sincerely,       Duke Pelayo MD

## (undated) DEVICE — BNDG ELAS ELITE V/CLOSE 4IN 5YD LF STRL

## (undated) DEVICE — APPL CHLORAPREP HI/LITE 26ML ORNG

## (undated) DEVICE — STCKNT IMPERV 9X36IN STRL

## (undated) DEVICE — UNDYED BRAIDED (POLYGLACTIN 910), SYNTHETIC ABSORBABLE SUTURE: Brand: COATED VICRYL

## (undated) DEVICE — SPLNT PLSTR ORTHO 5IN

## (undated) DEVICE — GLV SURG SIGNATURE ESSENTIAL PF LTX SZ7

## (undated) DEVICE — ARM SLING: Brand: DEROYAL

## (undated) DEVICE — GLV SURG BIOGEL LTX PF 6 1/2

## (undated) DEVICE — GLV SURG SIGNATURE ESSENTIAL PF LTX SZ8.5

## (undated) DEVICE — SPNG LAP 18X18IN LF STRL PK/5

## (undated) DEVICE — 3M™ STERI-DRAPE™ U-DRAPE 1015: Brand: STERI-DRAPE™

## (undated) DEVICE — GLV SURG BIOGEL LTX PF 8 1/2

## (undated) DEVICE — UNDERCAST PADDING: Brand: DEROYAL

## (undated) DEVICE — DRAPE,REIN 53X77,STERILE: Brand: MEDLINE

## (undated) DEVICE — SUT ETHLN 3/0 PS1 18IN 1663H

## (undated) DEVICE — SOL ISO/ALC RUB 70PCT 4OZ

## (undated) DEVICE — PK ORTHO MINOR TOWER 40

## (undated) DEVICE — DRSNG WND GZ CURAD OIL EMULSION 3X3IN STRL

## (undated) DEVICE — BNDG ELAS CO-FLEX SLF ADHR 6IN 5YD LF STRL

## (undated) DEVICE — SUT VIC 2/0 CT2 27IN J269H

## (undated) DEVICE — BNDG ELAS MATRX  2IN 5YD LF STRL

## (undated) DEVICE — SKIN PREP TRAY W/CHG: Brand: MEDLINE INDUSTRIES, INC.

## (undated) DEVICE — TOWEL,OR,DSP,ST,BLUE,STD,4/PK,20PK/CS: Brand: MEDLINE